# Patient Record
Sex: FEMALE | Race: WHITE | NOT HISPANIC OR LATINO | Employment: OTHER | URBAN - METROPOLITAN AREA
[De-identification: names, ages, dates, MRNs, and addresses within clinical notes are randomized per-mention and may not be internally consistent; named-entity substitution may affect disease eponyms.]

---

## 2017-01-17 ENCOUNTER — HOSPITAL ENCOUNTER (OUTPATIENT)
Facility: MEDICAL CENTER | Age: 82
End: 2017-01-17
Attending: FAMILY MEDICINE
Payer: MEDICARE

## 2017-01-17 LAB
APPEARANCE UR: ABNORMAL
BACTERIA #/AREA URNS HPF: ABNORMAL /HPF
BILIRUB UR QL STRIP.AUTO: NEGATIVE
COLOR UR AUTO: ABNORMAL
CULTURE IF INDICATED INDCX: YES UA CULTURE
EPITHELIAL CELLS 1715: ABNORMAL /HPF
GLUCOSE UR STRIP.AUTO-MCNC: NEGATIVE MG/DL
KETONES UR STRIP.AUTO-MCNC: NEGATIVE MG/DL
LEUKOCYTE ESTERASE UR QL STRIP.AUTO: ABNORMAL
MICRO URNS: ABNORMAL
MUCOUS THREADS URNS QL MICRO: ABNORMAL /HPF
NITRITE UR QL STRIP.AUTO: POSITIVE
PH UR: 6.5 [PH]
PROT UR QL STRIP: NEGATIVE MG/DL
RBC #/AREA URNS HPF: ABNORMAL /HPF
RBC UR QL AUTO: ABNORMAL
SP GR UR STRIP.AUTO: 1.01
WBC #/AREA URNS HPF: ABNORMAL /HPF

## 2017-01-17 PROCEDURE — 81001 URINALYSIS AUTO W/SCOPE: CPT

## 2017-01-17 PROCEDURE — 87186 SC STD MICRODIL/AGAR DIL: CPT

## 2017-01-17 PROCEDURE — 87086 URINE CULTURE/COLONY COUNT: CPT

## 2017-01-17 PROCEDURE — 87077 CULTURE AEROBIC IDENTIFY: CPT

## 2017-01-19 LAB
BACTERIA UR CULT: ABNORMAL
SIGNIFICANT IND 70042: ABNORMAL
SOURCE SOURCE: ABNORMAL

## 2017-03-10 ENCOUNTER — OFFICE VISIT (OUTPATIENT)
Dept: CARDIOLOGY | Facility: MEDICAL CENTER | Age: 82
End: 2017-03-10
Payer: MEDICARE

## 2017-03-10 VITALS
BODY MASS INDEX: 22.71 KG/M2 | SYSTOLIC BLOOD PRESSURE: 105 MMHG | DIASTOLIC BLOOD PRESSURE: 70 MMHG | HEIGHT: 64 IN | WEIGHT: 133 LBS | HEART RATE: 83 BPM | OXYGEN SATURATION: 94 %

## 2017-03-10 DIAGNOSIS — I10 ESSENTIAL HYPERTENSION: ICD-10-CM

## 2017-03-10 DIAGNOSIS — D64.89 ANEMIA DUE TO OTHER CAUSE: ICD-10-CM

## 2017-03-10 DIAGNOSIS — I35.0 AORTIC STENOSIS, SEVERE: ICD-10-CM

## 2017-03-10 PROCEDURE — G8484 FLU IMMUNIZE NO ADMIN: HCPCS | Performed by: INTERNAL MEDICINE

## 2017-03-10 PROCEDURE — 4040F PNEUMOC VAC/ADMIN/RCVD: CPT | Mod: 8P | Performed by: INTERNAL MEDICINE

## 2017-03-10 PROCEDURE — 1036F TOBACCO NON-USER: CPT | Performed by: INTERNAL MEDICINE

## 2017-03-10 PROCEDURE — 99497 ADVNCD CARE PLAN 30 MIN: CPT | Performed by: INTERNAL MEDICINE

## 2017-03-10 PROCEDURE — 1101F PT FALLS ASSESS-DOCD LE1/YR: CPT | Mod: 8P | Performed by: INTERNAL MEDICINE

## 2017-03-10 PROCEDURE — G8419 CALC BMI OUT NRM PARAM NOF/U: HCPCS | Performed by: INTERNAL MEDICINE

## 2017-03-10 PROCEDURE — G8432 DEP SCR NOT DOC, RNG: HCPCS | Performed by: INTERNAL MEDICINE

## 2017-03-10 PROCEDURE — G8598 ASA/ANTIPLAT THER USED: HCPCS | Performed by: INTERNAL MEDICINE

## 2017-03-10 PROCEDURE — 99214 OFFICE O/P EST MOD 30 MIN: CPT | Performed by: INTERNAL MEDICINE

## 2017-03-10 ASSESSMENT — ENCOUNTER SYMPTOMS
BRUISES/BLEEDS EASILY: 0
SPEECH CHANGE: 0
DIZZINESS: 0
VOMITING: 0
NAUSEA: 0
CHILLS: 0
CLAUDICATION: 0
BLOOD IN STOOL: 0
ABDOMINAL PAIN: 0
HALLUCINATIONS: 0
FALLS: 0
PND: 0
WEIGHT LOSS: 0
EYE DISCHARGE: 0
MYALGIAS: 0
FEVER: 0
EYE PAIN: 0
SHORTNESS OF BREATH: 0
HEADACHES: 0
BLURRED VISION: 0
ORTHOPNEA: 0
DEPRESSION: 0
LOSS OF CONSCIOUSNESS: 0
PALPITATIONS: 0
DOUBLE VISION: 0
COUGH: 0
SENSORY CHANGE: 0

## 2017-03-10 NOTE — Clinical Note
[facility]   [address], [facility] [address]  [place] [address]  Phone: [phone]  Fax: [phone]              Noemi Gutierrez  3/6/1933    Encounter Date: 3/10/2017    Marcia Cabrera M.D.          PROGRESS NOTE:  Subjective:   Noemi Gutierrez is a 84 y.o. female who presents today for cardiac evaluation of severe AS (mean gradient of 42 mm Hg on TTE). She is living in [facility] now. Patient does report of having lightheadedness and dyspnea on exertion. But the most bothersome symptoms are her LE weakness from back problems. No angina. She is in wheel chair most of the time. Some feet swelling. In the past, several times we brought up the idea of TAVR but patient did not want to pursue that option. She would like to die a natural death. Patient has not been moving around much however. She is quite deconditioned.    No clinical change since last visit.    Past Medical History   Diagnosis Date   • Arthritis    • Hypertension    • Indigestion    • Backpain    • CATARACT    • Unspecified urinary incontinence    • Breast cancer (CMS-[facility]) 11/3/2010   • HTN (hypertension) 11/3/2010   • Dyslipidemia 11/3/2010   • Osteopenia 11/3/2010   • Eczema 12/1/2010   • Ulcer (CMS-HCC) 2001     bleeding; requiring blood transfusion   • Stroke (CMS-HCC) 11/2010     mild right sided weakness   • Hiatus hernia syndrome    • Cancer (CMS-HCC) 2009     right breast, radiation   • Dental disorder      from radiation tx   • ASTHMA      sinus problems   • Heart murmur 11/10/2015     Past Surgical History   Procedure Laterality Date   • Other orthopedic surgery     • Laminotomy  2004   • Appendectomy child     • Tonsillectomy  as child   • Inguinal hernia repair  2006     right   • Lumpectomy  10/2009     right   • Colonoscopy flex w/endo us  1/18/2011     Performed by ELVIN LUCIO at [facility]   • Lumbar fusion posterior  6/13/2013     Performed by Gilbert Lee M.D.  at SURGERY [facility]   • Lumbar decompression  6/13/2013     Performed by Gilbert Lee M.D. at SURGERY [facility]     Family History   Problem Relation Age of Onset   • Heart Disease     • Hypertension     • Stroke     • Cancer       History   Smoking status   • Never Smoker    Smokeless tobacco   • Never Used     Allergies   Allergen Reactions   • Codeine Itching   • Lisinopril Cough   • Sulfa Drugs Itching     Outpatient Encounter Prescriptions as of 3/10/2017   Medication Sig Dispense Refill   • lisinopril (PRINIVIL) 5 MG Tab Take 5 mg by mouth every day.     • senna-docusate (PERICOLACE OR SENOKOT S) 8.6-50 MG Tab Take 1 Tab by mouth every day.     • duloxetine (CYMBALTA) 30 MG Cap DR Particles Take 30 mg by mouth every day.     • Ferrous Fumarate 325 (106 FE) MG Tab Take  by mouth.     • gabapentin (NEURONTIN) 100 MG Cap Take 100 mg by mouth every day.     • latanoprost (XALATAN) 0.005 % Solution Place 1 Drop in both eyes every evening.     • omeprazole (PRILOSEC) 20 MG delayed-release capsule Take 20 mg by mouth every day.     • acetaminophen (TYLENOL) 500 MG Tab Take 500-1,000 mg by mouth every 6 hours as needed.     • Cyanocobalamin (VITAMIN B-12) 1000 MCG Tab Take 1,000 mcg by mouth every day.     • Cholecalciferol (VITAMIN D3) 5000 UNITS Tab Take  by mouth.     • aspirin EC (ECOTRIN) 81 MG Tablet Delayed Response Take 81 mg by mouth every day.     • cefTRIAXone (ROCEPHIN) 1 GM Recon Soln 1 g by Intramuscular route every day.     • ciprofloxacin (CIPRO) 500 MG Tab Take 500 mg by mouth 2 times a day.     • oxycodone-acetaminophen (PERCOCET) 5-325 MG Tab Take 1-2 Tabs by mouth every four hours as needed.     • melatonin 3 MG Tab Take 3 mg by mouth every bedtime.     • furosemide (LASIX) 20 MG Tab Take 1 Tab by mouth as needed. 30 Tab 0   • nitrofurantoin monohydr macro (MACROBID) 100 MG Cap Take 100 mg by mouth every day.     • alprazolam (XANAX) 0.5 MG Tab Take 0.5 mg by mouth at bedtime as  "needed for Sleep.     • simvastatin (ZOCOR) 20 MG TABS Take 1 Tab by mouth every evening. 30 Tab      No facility-administered encounter medications on file as of 3/10/2017.     Review of Systems   Constitutional: Negative for fever, chills, weight loss and malaise/fatigue.   HENT: Negative for ear discharge, ear pain, hearing loss and nosebleeds.    Eyes: Negative for blurred vision, double vision, pain and discharge.   Respiratory: Negative for cough and shortness of breath.    Cardiovascular: Negative for chest pain, palpitations, orthopnea, claudication, leg swelling and PND.   Gastrointestinal: Negative for nausea, vomiting, abdominal pain, blood in stool and melena.   Genitourinary: Negative for dysuria and hematuria.   Musculoskeletal: Negative for myalgias, joint pain and falls.   Skin: Negative for itching and rash.   Neurological: Negative for dizziness, sensory change, speech change, loss of consciousness and headaches.   Endo/Heme/Allergies: Negative for environmental allergies. Does not bruise/bleed easily.   Psychiatric/Behavioral: Negative for depression, suicidal ideas and hallucinations.        Objective:   /70 mmHg  Pulse 83  Ht 1.626 m (5' 4\")  Wt 60.328 kg (133 lb)  BMI 22.82 kg/m2  SpO2 94%    Physical Exam   Constitutional: She is oriented to person, place, and time. No distress.   HENT:   Head: Normocephalic and atraumatic.   Eyes: EOM are normal.   Neck: No JVD present.   Cardiovascular: Normal rate, regular rhythm and intact distal pulses.  Exam reveals no gallop and no friction rub.    Murmur heard.  there is 4/6 systolic murmur heard best at the right border of the aortic valve area. The murmur does not radiate to the carotids.     Pulmonary/Chest: No respiratory distress. She has no wheezes. She has no rales. She exhibits no tenderness.   Abdominal: She exhibits no distension. There is no tenderness. There is no rebound and no guarding.   Musculoskeletal: She exhibits no edema " or tenderness.   Lymphadenopathy:     She has no cervical adenopathy.   Neurological: She is alert and oriented to person, place, and time.   Skin: Skin is dry.   Psychiatric: She has a normal mood and affect.   Nursing note and vitals reviewed.      Assessment:     1. Essential hypertension     2. Aortic stenosis, severe     3. Anemia due to other cause         Medical Decision Making:  Today's Assessment / Status / Plan:     This is a deconditioned 84 yo patient with symptomatic severe AS.  I again discussed different options with patient in regards to open heart surgery vs. TAVR. Patient for sure does not want to proceed with any open heart surgery at this time or TAVR.     I spent half an hours with her face-to-face in discussing with her and her daughter about end-of-life issues. At this time, we will fill out POLST. Patient would like to be DNR.    Cont current medications at current dose.     Lasix prn for feet swelling.    Encourage more ambulation and raise feet up.    I will see patient back in clinic with lab tests and studies results in 9 months.    I thank you Dr. Chacon for referring patient to our Cardiology Clinic today.      Rafael Medley M.D.  [address]  [address]  [place] [id number]  VIA Mail

## 2017-03-10 NOTE — MR AVS SNAPSHOT
"        Noemi Gutierrez   3/10/2017 1:00 PM   Office Visit   MRN: [id number]    Department:  [facility]   Dept Phone:  [phone]    Description:  Female : 3/6/1933   Provider:  Marcia Cabrera M.D.           Reason for Visit     Follow-Up           Allergies as of 3/10/2017     Allergen Noted Reactions    Codeine 2010   Itching    Lisinopril 2016   Cough    Sulfa Drugs 2010   Itching      You were diagnosed with     Essential hypertension   [8359882]       Aortic stenosis, severe   [302276]       Anemia due to other cause   [8900802]         Vital Signs     Blood Pressure Pulse Height Weight Body Mass Index Oxygen Saturation    105/70 mmHg 83 1.626 m (5' 4\") 60.328 kg (133 lb) 22.82 kg/m2 94%    Smoking Status                   Never Smoker            Basic Information     Date Of Birth Sex Race Ethnicity Preferred Language    3/6/1933 Female White Non- English      Problem List              ICD-10-CM Priority Class Noted - Resolved    Breast cancer (CMS-HCC) C50.919   11/3/2010 - Present    HTN (hypertension) I10   11/3/2010 - Present    Dyslipidemia E78.5   11/3/2010 - Present    Cerebral infarction (CMS-HCC) I63.9   11/3/2010 - Present    Osteopenia M85.80   11/3/2010 - Present    Eczema L30.9   2010 - Present    Spinal stenosis, lumbar region, without neurogenic claudication M48.06   2013 - Present    Lower urinary tract infectious disease N39.0 Medium  2015 - Present    Encephalopathy acute G93.40 High  11/10/2015 - Present    Heart murmur R01.1   11/10/2015 - Present      Health Maintenance        Date Due Completion Dates    IMM DTaP/Tdap/Td Vaccine (1 - Tdap) 3/6/1952 ---    PAP SMEAR 3/6/1954 ---    IMM ZOSTER VACCINE 3/6/1993 ---    BONE DENSITY 3/6/1998 ---    IMM PNEUMOCOCCAL 65+ (ADULT) LOW/MEDIUM RISK SERIES (1 of 2 - PCV13) 3/6/1998 ---    MAMMOGRAM 2016, 2015, 11/10/2008, 11/10/2008, 2007, 2007, 11/3/2006, 2005, " 10/28/2004    IMM INFLUENZA (1) 9/1/2016 11/12/2015, 10/1/2012    COLONOSCOPY 1/18/2021 1/18/2011            Current Immunizations     Influenza TIV (IM) 10/1/2012    Influenza Vaccine Adult HD 11/12/2015  2:17 PM    Pneumococcal Vaccine (UF)Historical Data 1/1/2012      Below and/or attached are the medications your provider expects you to take. Review all of your home medications and newly ordered medications with your provider and/or pharmacist. Follow medication instructions as directed by your provider and/or pharmacist. Please keep your medication list with you and share with your provider. Update the information when medications are discontinued, doses are changed, or new medications (including over-the-counter products) are added; and carry medication information at all times in the event of emergency situations     Allergies:  CODEINE - Itching     LISINOPRIL - Cough     SULFA DRUGS - Itching               Medications  Valid as of: March 10, 2017 -  1:33 PM    Generic Name Brand Name Tablet Size Instructions for use    Acetaminophen (Tab) TYLENOL 500 MG Take 500-1,000 mg by mouth every 6 hours as needed.        ALPRAZolam (Tab) XANAX 0.5 MG Take 0.5 mg by mouth at bedtime as needed for Sleep.        Aspirin (Tablet Delayed Response) ECOTRIN 81 MG Take 81 mg by mouth every day.        CefTRIAXone Sodium (Recon Soln) ROCEPHIN 1 GM 1 g by Intramuscular route every day.        Cholecalciferol (Tab) Vitamin D3 5000 UNITS Take  by mouth.        Ciprofloxacin HCl (Tab) CIPRO 500 MG Take 500 mg by mouth 2 times a day.        Cyanocobalamin (Tab) vitamin B-12 1000 MCG Take 1,000 mcg by mouth every day.        DULoxetine HCl (Cap DR Particles) CYMBALTA 30 MG Take 30 mg by mouth every day.        Ferrous Fumarate (Tab) Ferrous Fumarate 325 (106 FE) MG Take  by mouth.        Furosemide (Tab) LASIX 20 MG Take 1 Tab by mouth as needed.        Gabapentin (Cap) NEURONTIN 100 MG Take 100 mg by mouth every day.         Latanoprost (Solution) XALATAN 0.005 % Place 1 Drop in both eyes every evening.        Lisinopril (Tab) PRINIVIL 5 MG Take 5 mg by mouth every day.        Melatonin (Tab) melatonin 3 MG Take 3 mg by mouth every bedtime.        Nitrofurantoin Monohyd Macro (Cap) MACROBID 100 MG Take 100 mg by mouth every day.        Omeprazole (CAPSULE DELAYED RELEASE) PRILOSEC 20 MG Take 20 mg by mouth every day.        Oxycodone-Acetaminophen (Tab) PERCOCET 5-325 MG Take 1-2 Tabs by mouth every four hours as needed.        Sennosides-Docusate Sodium (Tab) PERICOLACE or SENOKOT S 8.6-50 MG Take 1 Tab by mouth every day.        Simvastatin (Tab) ZOCOR 20 MG Take 1 Tab by mouth every evening.        .                 Medicines prescribed today were sent to:     [facility] [address] [place] [address] (), [place] - [address]    [address]) [place] [address]    Phone: [phone] Fax: [phone]    Open 24 Hours?: No    [facility] [address] [place] [address], [place] - [address] [facility]    [address] [place] [address] [place] [address]    Phone: [phone] Fax: [phone]    Open 24 Hours?: No      Medication refill instructions:       If your prescription bottle indicates you have medication refills left, it is not necessary to call your provider’s office. Please contact your pharmacy and they will refill your medication.    If your prescription bottle indicates you do not have any refills left, you may request refills at any time through one of the following ways: The online [employer] system (except Urgent Care), by calling your provider’s office, or by asking your pharmacy to contact your provider’s office with a refill request. Medication refills are processed only during regular business hours and may not be available until the next business day. Your provider may request additional information or to have a follow-up visit with you prior to refilling your medication.   *Please Note: Medication refills are assigned a new Rx number when refilled  electronically. Your pharmacy may indicate that no refills were authorized even though a new prescription for the same medication is available at the pharmacy. Please request the medicine by name with the pharmacy before contacting your provider for a refill.           [employer] Access Code: [email or web address]  Expires: 4/9/2017  1:33 PM    [employer]  A secure, online tool to manage your health information     [employer]’s [employer]® is a secure, online tool that connects you to your personalized health information from the privacy of your home -- day or night - making it very easy for you to manage your healthcare. Once the activation process is completed, you can even access your medical information using the [employer] carlos a, which is available for free in the Apple Carlos A store or Google Play store.     [employer] provides the following levels of access (as shown below):   My Chart Features   [facility] Primary Care Doctor [facility]  Specialists [facility]  Urgent  Care Non-[facility]  Primary Care  Doctor   Email your healthcare team securely and privately 24/7 X X X    Manage appointments: schedule your next appointment; view details of past/upcoming appointments X      Request prescription refills. X      View recent personal medical records, including lab and immunizations X X X X   View health record, including health history, allergies, medications X X X X   Read reports about your outpatient visits, procedures, consult and ER notes X X X X   See your discharge summary, which is a recap of your hospital and/or ER visit that includes your diagnosis, lab results, and care plan. X X       How to register for [employer]:  1. Go to  [email or web address].  2. Click on the Sign Up Now box, which takes you to the New Member Sign Up page. You will need to provide the following information:  a. Enter your [employer] Access Code exactly as it appears at the top of this page. (You will not need to use this code after you’ve completed the sign-up  process. If you do not sign up before the expiration date, you must request a new code.)   b. Enter your date of birth.   c. Enter your home email address.   d. Click Submit, and follow the next screen’s instructions.  3. Create a Indeedt ID. This will be your Indeedt login ID and cannot be changed, so think of one that is secure and easy to remember.  4. Create a Indeed[employer] password. You can change your password at any time.  5. Enter your Password Reset Question and Answer. This can be used at a later time if you forget your password.   6. Enter your e-mail address. This allows you to receive e-mail notifications when new information is available in [employer].  7. Click Sign Up. You can now view your health information.    For assistance activating your [employer] account, call [phone]

## 2017-03-10 NOTE — PROGRESS NOTES
Subjective:   Noemi Gutierrez is a 84 y.o. female who presents today for cardiac evaluation of severe AS (mean gradient of 42 mm Hg on TTE). She is living in [facility] now. Patient does report of having lightheadedness and dyspnea on exertion. But the most bothersome symptoms are her LE weakness from back problems. No angina. She is in wheel chair most of the time. Some feet swelling. In the past, several times we brought up the idea of TAVR but patient did not want to pursue that option. She would like to die a natural death. Patient has not been moving around much however. She is quite deconditioned.    No clinical change since last visit.    Past Medical History   Diagnosis Date   • Arthritis    • Hypertension    • Indigestion    • Backpain    • CATARACT    • Unspecified urinary incontinence    • Breast cancer (CMS-HCC) 11/3/2010   • HTN (hypertension) 11/3/2010   • Dyslipidemia 11/3/2010   • Osteopenia 11/3/2010   • Eczema 12/1/2010   • Ulcer (CMS-HCC) 2001     bleeding; requiring blood transfusion   • Stroke (CMS-HCC) 11/2010     mild right sided weakness   • Hiatus hernia syndrome    • Cancer (CMS-HCC) 2009     right breast, radiation   • Dental disorder      from radiation tx   • ASTHMA      sinus problems   • Heart murmur 11/10/2015     Past Surgical History   Procedure Laterality Date   • Other orthopedic surgery     • Laminotomy  2004   • Appendectomy child     • Tonsillectomy  as child   • Inguinal hernia repair  2006     right   • Lumpectomy  10/2009     right   • Colonoscopy flex w/endo us  1/18/2011     Performed by ELVIN LUCIO at [facility]ERY [facility]   • Lumbar fusion posterior  6/13/2013     Performed by Gilbert Lee M.D. at [facility]   • Lumbar decompression  6/13/2013     Performed by Gilbert Lee M.D. at SUR[facility]ERY [facility]     Family History   Problem Relation Age of Onset   • Heart Disease     • Hypertension     • Stroke     • Cancer       History   Smoking  status   • Never Smoker    Smokeless tobacco   • Never Used     Allergies   Allergen Reactions   • Codeine Itching   • Lisinopril Cough   • Sulfa Drugs Itching     Outpatient Encounter Prescriptions as of 3/10/2017   Medication Sig Dispense Refill   • lisinopril (PRINIVIL) 5 MG Tab Take 5 mg by mouth every day.     • senna-docusate (PERICOLACE OR SENOKOT S) 8.6-50 MG Tab Take 1 Tab by mouth every day.     • duloxetine (CYMBALTA) 30 MG Cap DR Particles Take 30 mg by mouth every day.     • Ferrous Fumarate 325 (106 FE) MG Tab Take  by mouth.     • gabapentin (NEURONTIN) 100 MG Cap Take 100 mg by mouth every day.     • latanoprost (XALATAN) 0.005 % Solution Place 1 Drop in both eyes every evening.     • omeprazole (PRILOSEC) 20 MG delayed-release capsule Take 20 mg by mouth every day.     • acetaminophen (TYLENOL) 500 MG Tab Take 500-1,000 mg by mouth every 6 hours as needed.     • Cyanocobalamin (VITAMIN B-12) 1000 MCG Tab Take 1,000 mcg by mouth every day.     • Cholecalciferol (VITAMIN D3) 5000 UNITS Tab Take  by mouth.     • aspirin EC (ECOTRIN) 81 MG Tablet Delayed Response Take 81 mg by mouth every day.     • cefTRIAXone (ROCEPHIN) 1 GM Recon Soln 1 g by Intramuscular route every day.     • ciprofloxacin (CIPRO) 500 MG Tab Take 500 mg by mouth 2 times a day.     • oxycodone-acetaminophen (PERCOCET) 5-325 MG Tab Take 1-2 Tabs by mouth every four hours as needed.     • melatonin 3 MG Tab Take 3 mg by mouth every bedtime.     • furosemide (LASIX) 20 MG Tab Take 1 Tab by mouth as needed. 30 Tab 0   • nitrofurantoin monohydr macro (MACROBID) 100 MG Cap Take 100 mg by mouth every day.     • alprazolam (XANAX) 0.5 MG Tab Take 0.5 mg by mouth at bedtime as needed for Sleep.     • simvastatin (ZOCOR) 20 MG TABS Take 1 Tab by mouth every evening. 30 Tab      No facility-administered encounter medications on file as of 3/10/2017.     Review of Systems   Constitutional: Negative for fever, chills, weight loss and  "malaise/fatigue.   HENT: Negative for ear discharge, ear pain, hearing loss and nosebleeds.    Eyes: Negative for blurred vision, double vision, pain and discharge.   Respiratory: Negative for cough and shortness of breath.    Cardiovascular: Negative for chest pain, palpitations, orthopnea, claudication, leg swelling and PND.   Gastrointestinal: Negative for nausea, vomiting, abdominal pain, blood in stool and melena.   Genitourinary: Negative for dysuria and hematuria.   Musculoskeletal: Negative for myalgias, joint pain and falls.   Skin: Negative for itching and rash.   Neurological: Negative for dizziness, sensory change, speech change, loss of consciousness and headaches.   Endo/Heme/Allergies: Negative for environmental allergies. Does not bruise/bleed easily.   Psychiatric/Behavioral: Negative for depression, suicidal ideas and hallucinations.        Objective:   /70 mmHg  Pulse 83  Ht 1.626 m (5' 4\")  Wt 60.328 kg (133 lb)  BMI 22.82 kg/m2  SpO2 94%    Physical Exam   Constitutional: She is oriented to person, place, and time. No distress.   HENT:   Head: Normocephalic and atraumatic.   Eyes: EOM are normal.   Neck: No JVD present.   Cardiovascular: Normal rate, regular rhythm and intact distal pulses.  Exam reveals no gallop and no friction rub.    Murmur heard.  there is 4/6 systolic murmur heard best at the right border of the aortic valve area. The murmur does not radiate to the carotids.     Pulmonary/Chest: No respiratory distress. She has no wheezes. She has no rales. She exhibits no tenderness.   Abdominal: She exhibits no distension. There is no tenderness. There is no rebound and no guarding.   Musculoskeletal: She exhibits no edema or tenderness.   Lymphadenopathy:     She has no cervical adenopathy.   Neurological: She is alert and oriented to person, place, and time.   Skin: Skin is dry.   Psychiatric: She has a normal mood and affect.   Nursing note and vitals " reviewed.      Assessment:     1. Essential hypertension     2. Aortic stenosis, severe     3. Anemia due to other cause         Medical Decision Making:  Today's Assessment / Status / Plan:     This is a deconditioned 82 yo patient with symptomatic severe AS.  I again discussed different options with patient in regards to open heart surgery vs. TAVR. Patient for sure does not want to proceed with any open heart surgery at this time or TAVR.     I spent half an hours with her face-to-face in discussing with her and her daughter about end-of-life issues. At this time, we will fill out POLST. Patient would like to be DNR.    Cont current medications at current dose.     Lasix prn for feet swelling.    Encourage more ambulation and raise feet up.    I will see patient back in clinic with lab tests and studies results in 9 months.    I thank you Dr. Chacon for referring patient to our Cardiology Clinic today.

## 2017-08-25 ENCOUNTER — OFFICE VISIT (OUTPATIENT)
Dept: NEPHROLOGY | Facility: MEDICAL CENTER | Age: 82
End: 2017-08-25
Payer: MEDICARE

## 2017-08-25 VITALS
SYSTOLIC BLOOD PRESSURE: 104 MMHG | OXYGEN SATURATION: 94 % | HEART RATE: 96 BPM | DIASTOLIC BLOOD PRESSURE: 66 MMHG | TEMPERATURE: 98.4 F | BODY MASS INDEX: 23.05 KG/M2 | HEIGHT: 64 IN | WEIGHT: 135 LBS

## 2017-08-25 DIAGNOSIS — N39.0 RECURRENT UTI: ICD-10-CM

## 2017-08-25 PROCEDURE — 99203 OFFICE O/P NEW LOW 30 MIN: CPT | Performed by: INTERNAL MEDICINE

## 2017-08-25 ASSESSMENT — ENCOUNTER SYMPTOMS
FEVER: 0
CHILLS: 0
PALPITATIONS: 0

## 2017-08-25 NOTE — PROGRESS NOTES
"Subjective:      Noemi Gutierrez is a 84 y.o. female who presents with Recurrent UTI New Patient            HPI  84 year old who comes to the clinic noting recurrent UTIs. She is at [facility], and her daughter states that she has had UTIs about monthly for the last year. She is currently on cipro 500mg BID and has been on this for the whole month. She has also been on macrobid as prophylaxis but was discontinued with the UTI.    The patient has had a renal US showing a right kidney of 7.5 cm and and no stones, and left kidney with 8.4 cm with a 7.2cm renal cyst.    She has functional incontinence and she is unsure how often these are changed. She has just seen urology and their recommendations are noted.    UTIs noted are ecoli in June and July and Klebsiella in August.  Review of Systems   Constitutional: Negative for fever and chills.   Cardiovascular: Negative for chest pain and palpitations.   Genitourinary: Positive for dysuria.          Objective:     /66 mmHg  Pulse 96  Temp(Src) 36.9 °C (98.4 °F)  Ht 1.626 m (5' 4.02\")  Wt 61.236 kg (135 lb)  BMI 23.16 kg/m2  SpO2 94%     Physical Exam   Constitutional: She is oriented to person, place, and time. She appears well-developed and well-nourished.   Cardiovascular: Normal rate and regular rhythm.    Pulmonary/Chest: Effort normal and breath sounds normal.   Neurological: She is alert and oriented to person, place, and time.   Skin: Skin is warm and dry.   Psychiatric: She has a normal mood and affect. Her behavior is normal.               Assessment/Plan:     1. Recurrent UTI    -From a renal standpoint, there appears to be no nidus of infection that is causing recurrent UTIs. Is suspect this is due to her reliance on her adult undergarment in voiding, and not using a commode.   -Given sensitivities, the current infection should be treated rapidly, I agree with changing the prophylaxis abx.  Urology has recommended metenamine, if this does " not work for her, other options would include bactrim  -At this point, given the above, would not suggest further imaging at this time.  -The Cr is elevated, however, has some hemodynamic variability, would simply monitor.      2. CKD stage III  Cr indicates CKD stage III. However, if continues to climb, will need to adjust medication for GFR. Discussed with patient and daughter.    -At this point, will see PRN, if Cr increases  -urology is currently managing the recurrent UTIs

## 2017-08-25 NOTE — MR AVS SNAPSHOT
"        Noemi Gutierrez   2017 3:00 PM   Office Visit   MRN: [id number]    Department:  [facility]   Dept Phone:  [phone]    Description:  Female : 3/6/1933   Provider:  Davy Patrick M.D.           Reason for Visit     New Patient           Allergies as of 2017     Allergen Noted Reactions    Codeine 2010   Itching    Lisinopril 2016   Cough    Sulfa Drugs 2010   Itching      You were diagnosed with     Recurrent UTI   [[id number]]         Vital Signs     Blood Pressure Pulse Temperature Height Weight Body Mass Index    104/66 mmHg 96 36.9 °C (98.4 °F) 1.626 m (5' 4.02\") 61.236 kg (135 lb) 23.16 kg/m2    Oxygen Saturation Smoking Status                94% Never Smoker           Basic Information     Date Of Birth Sex Race Ethnicity Preferred Language    3/6/1933 Female White Non- English      Your appointments     Oct 13, 2017  1:45 PM   FOLLOW UP with Marcia Cabrera M.D.   [facility] (--)    [address], [address]  [place] [address]   [phone]              Problem List              ICD-10-CM Priority Class Noted - Resolved    Breast cancer (CMS-HCC) C50.919   11/3/2010 - Present    HTN (hypertension) I10   11/3/2010 - Present    Dyslipidemia E78.5   11/3/2010 - Present    Cerebral infarction (CMS-HCC) I63.9   11/3/2010 - Present    Osteopenia M85.80   11/3/2010 - Present    Eczema L30.9   2010 - Present    Spinal stenosis, lumbar region, without neurogenic claudication M48.06   2013 - Present    Heart murmur R01.1   11/10/2015 - Present    Recurrent UTI N39.0   2017 - Present      Health Maintenance        Date Due Completion Dates    IMM DTaP/Tdap/Td Vaccine (1 - Tdap) 3/6/1952 ---    PAP SMEAR 3/6/1954 ---    IMM ZOSTER VACCINE 3/6/1993 ---    BONE DENSITY 3/6/1998 ---    IMM PNEUMOCOCCAL 65+ (ADULT) LOW/MEDIUM RISK SERIES (1 of 2 - PCV13) 3/6/1998 ---    MAMMOGRAM 2016, " 11/10/2008, 11/10/2008, 11/6/2007, 11/6/2007, 11/3/2006, 11/1/2005, 10/28/2004    IMM INFLUENZA (1) 9/1/2017 11/12/2015, 10/1/2012    COLONOSCOPY 1/18/2021 1/18/2011            Current Immunizations     Influenza TIV (IM) 10/1/2012    Influenza Vaccine Adult HD 11/12/2015  2:17 PM    Pneumococcal Vaccine (UF)Historical Data 1/1/2012      Below and/or attached are the medications your provider expects you to take. Review all of your home medications and newly ordered medications with your provider and/or pharmacist. Follow medication instructions as directed by your provider and/or pharmacist. Please keep your medication list with you and share with your provider. Update the information when medications are discontinued, doses are changed, or new medications (including over-the-counter products) are added; and carry medication information at all times in the event of emergency situations     Allergies:  CODEINE - Itching     LISINOPRIL - Cough     SULFA DRUGS - Itching               Medications  Valid as of: August 25, 2017 -  3:46 PM    Generic Name Brand Name Tablet Size Instructions for use    Acetaminophen (Tab) TYLENOL 500 MG Take 500-1,000 mg by mouth every 6 hours as needed.        ALPRAZolam (Tab) XANAX 0.5 MG Take 0.5 mg by mouth at bedtime as needed for Sleep.        Aspirin (Tablet Delayed Response) ECOTRIN 81 MG Take 81 mg by mouth every day.        CefTRIAXone Sodium (Recon Soln) ROCEPHIN 1 GM 1 g by Intramuscular route every day.        Cholecalciferol (Tab) Vitamin D3 5000 units Take  by mouth.        Ciprofloxacin HCl (Tab) CIPRO 500 MG Take 500 mg by mouth 2 times a day.        Cyanocobalamin (Tab) vitamin B-12 1000 MCG Take 1,000 mcg by mouth every day.        DULoxetine HCl (Cap DR Particles) CYMBALTA 30 MG Take 30 mg by mouth every day.        Ferrous Fumarate (Tab) Ferrous Fumarate 325 (106 Fe) MG Take  by mouth.        Furosemide (Tab) LASIX 20 MG Take 1 Tab by mouth as needed.        Gabapentin  (Cap) NEURONTIN 100 MG Take 100 mg by mouth every day.        Latanoprost (Solution) XALATAN 0.005 % Place 1 Drop in both eyes every evening.        Lisinopril (Tab) PRINIVIL 5 MG Take 5 mg by mouth every day.        Melatonin (Tab) melatonin 3 MG Take 3 mg by mouth every bedtime.        Nitrofurantoin Monohyd Macro (Cap) MACROBID 100 MG Take 100 mg by mouth every day.        Omeprazole (CAPSULE DELAYED RELEASE) PRILOSEC 20 MG Take 20 mg by mouth every day.        Oxycodone-Acetaminophen (Tab) PERCOCET 5-325 MG Take 1-2 Tabs by mouth every four hours as needed.        Sennosides-Docusate Sodium (Tab) PERICOLACE or SENOKOT S 8.6-50 MG Take 1 Tab by mouth every day.        Simvastatin (Tab) ZOCOR 20 MG Take 1 Tab by mouth every evening.        .                 Medicines prescribed today were sent to:     [facility] [address] [place] [address], [place] [address]) [place] [address]    Phone: [phone] Fax: [phone]    Open 24 Hours?: No    [facility] [address] [place] [address], [place] - [address] [facility]    [address] [place] [address]    Phone: [phone] Fax: [phone]    Open 24 Hours?: No      Medication refill instructions:       If your prescription bottle indicates you have medication refills left, it is not necessary to call your provider’s office. Please contact your pharmacy and they will refill your medication.    If your prescription bottle indicates you do not have any refills left, you may request refills at any time through one of the following ways: The online [email or web address] system (except Urgent Care), by calling your provider’s office, or by asking your pharmacy to contact your provider’s office with a refill request. Medication refills are processed only during regular business hours and may not be available until the next business day. Your provider may request additional information or to have a follow-up visit with you prior to refilling your medication.   *Please Note: Medication  refills are assigned a new Rx number when refilled electronically. Your pharmacy may indicate that no refills were authorized even though a new prescription for the same medication is available at the pharmacy. Please request the medicine by name with the pharmacy before contacting your provider for a refill.           MyChart Status: Patient Declined

## 2017-12-19 ENCOUNTER — OFFICE VISIT (OUTPATIENT)
Dept: CARDIOLOGY | Facility: MEDICAL CENTER | Age: 82
End: 2017-12-19
Payer: MEDICARE

## 2017-12-19 VITALS
HEIGHT: 64 IN | HEART RATE: 88 BPM | SYSTOLIC BLOOD PRESSURE: 120 MMHG | WEIGHT: 139 LBS | BODY MASS INDEX: 23.73 KG/M2 | DIASTOLIC BLOOD PRESSURE: 68 MMHG | OXYGEN SATURATION: 95 %

## 2017-12-19 DIAGNOSIS — E78.5 DYSLIPIDEMIA: ICD-10-CM

## 2017-12-19 DIAGNOSIS — I51.89 LEFT VENTRICULAR DIASTOLIC DYSFUNCTION, NYHA CLASS 3: ICD-10-CM

## 2017-12-19 DIAGNOSIS — I35.0 AORTIC STENOSIS, SEVERE: ICD-10-CM

## 2017-12-19 DIAGNOSIS — M48.061 SPINAL STENOSIS, LUMBAR REGION, WITHOUT NEUROGENIC CLAUDICATION: ICD-10-CM

## 2017-12-19 DIAGNOSIS — I10 ESSENTIAL HYPERTENSION: ICD-10-CM

## 2017-12-19 PROCEDURE — 99215 OFFICE O/P EST HI 40 MIN: CPT | Performed by: INTERNAL MEDICINE

## 2017-12-19 RX ORDER — NYSTATIN 100000 U/G
CREAM TOPICAL 2 TIMES DAILY
COMMUNITY

## 2017-12-19 RX ORDER — TRAMADOL HYDROCHLORIDE 50 MG/1
50 TABLET ORAL
COMMUNITY
Start: 2017-12-26 | End: 2018-01-01

## 2017-12-19 ASSESSMENT — ENCOUNTER SYMPTOMS
EYE PAIN: 0
SPEECH CHANGE: 0
VOMITING: 0
PND: 0
CLAUDICATION: 0
ABDOMINAL PAIN: 0
FEVER: 0
EYE DISCHARGE: 0
PALPITATIONS: 0
NAUSEA: 0
SHORTNESS OF BREATH: 0
COUGH: 0
ORTHOPNEA: 0
BLURRED VISION: 0
HEADACHES: 0
SENSORY CHANGE: 0
BLOOD IN STOOL: 0
LOSS OF CONSCIOUSNESS: 0
FALLS: 0
BRUISES/BLEEDS EASILY: 0
HALLUCINATIONS: 0
WEIGHT LOSS: 0
DIZZINESS: 0
MYALGIAS: 0
DOUBLE VISION: 0
DEPRESSION: 0
CHILLS: 0

## 2017-12-19 NOTE — LETTER
[facility]   [address] [place] [address] [address]  [place] [address]  Phone: [phone]  Fax: [phone]              Noemi Gutierrez  3/6/1933    Encounter Date: 12/19/2017    Marcia Cabrera M.D.          PROGRESS NOTE:  Subjective:   Noemi Gutierrez is a 84 y.o. female who presents today for cardiac evaluation of severe AS (mean gradient of 42 mm Hg on TTE). She is living in [facility] now. Patient does report of having lightheadedness and dyspnea on exertion. But the most bothersome symptoms are her LE weakness from back problems.She is in wheel chair most of the time. Some feet swelling. In the past, several times we brought up the idea of TAVR but patient did not want to pursue that option. She would like to die a natural death. Patient has not been moving around much however. She is quite deconditioned.     Since last visit, Her cognitive function has been declining per her daughter's report.    Past Medical History:   Diagnosis Date   • Arthritis    • ASTHMA     sinus problems   • Backpain    • Breast cancer (CMS-HCC) 11/3/2010   • Cancer (CMS-HCC) 2009    right breast, radiation   • CATARACT    • Dental disorder     from radiation tx   • Dyslipidemia 11/3/2010   • Eczema 12/1/2010   • Heart murmur 11/10/2015   • Hiatus hernia syndrome    • HTN (hypertension) 11/3/2010   • Hypertension    • Indigestion    • Osteopenia 11/3/2010   • Stroke (CMS-HCC) 11/2010    mild right sided weakness   • Ulcer (CMS-[facility]) 2001    bleeding; requiring blood transfusion   • Unspecified urinary incontinence      Past Surgical History:   Procedure Laterality Date   • LUMBAR FUSION POSTERIOR  6/13/2013    Performed by Gilbert Lee M.D. at SURGERY [facility] ORS   • LUMBAR DECOMPRESSION  6/13/2013    Performed by Gilbert Lee M.D. at SURGERY [facility] ORS   • COLONOSCOPY FLEX W/ENDO US  1/18/2011    Performed by ELVIN LUCIO at SURGERY [facility]   • LUMPECTOMY   10/2009    right   • INGUINAL HERNIA REPAIR  2006    right   • LAMINOTOMY  2004   • APPENDECTOMY CHILD     • OTHER ORTHOPEDIC SURGERY     • TONSILLECTOMY  as child     Family History   Problem Relation Age of Onset   • Heart Disease     • Hypertension     • Stroke     • Cancer       History   Smoking Status   • Never Smoker   Smokeless Tobacco   • Never Used     Allergies   Allergen Reactions   • Codeine Itching   • Lisinopril Cough   • Sulfa Drugs Itching     Outpatient Encounter Prescriptions as of 12/19/2017   Medication Sig Dispense Refill   • tramadol (ULTRAM) 50 MG Tab Take 50 mg by mouth every four hours as needed.     • nystatin (MYCOSTATIN) 517816 UNIT/GM Cream topical cream Apply  to affected area(s) 2 times a day.     • senna-docusate (PERICOLACE OR SENOKOT S) 8.6-50 MG Tab Take 1 Tab by mouth every day.     • duloxetine (CYMBALTA) 30 MG Cap DR Particles Take 30 mg by mouth every day.     • Ferrous Fumarate 325 (106 FE) MG Tab Take  by mouth.     • gabapentin (NEURONTIN) 100 MG Cap Take 100 mg by mouth every day.     • latanoprost (XALATAN) 0.005 % Solution Place 1 Drop in both eyes every evening.     • melatonin 3 MG Tab Take 3 mg by mouth every bedtime.     • omeprazole (PRILOSEC) 20 MG delayed-release capsule Take 20 mg by mouth every day.     • acetaminophen (TYLENOL) 500 MG Tab Take 500-1,000 mg by mouth every 6 hours as needed.     • Cyanocobalamin (VITAMIN B-12) 1000 MCG Tab Take 1,000 mcg by mouth every day.     • Cholecalciferol (VITAMIN D3) 5000 UNITS Tab Take  by mouth.     • nitrofurantoin monohydr macro (MACROBID) 100 MG Cap Take 100 mg by mouth every day.     • aspirin EC (ECOTRIN) 81 MG Tablet Delayed Response Take 81 mg by mouth every day.     • lisinopril (PRINIVIL) 5 MG Tab Take 5 mg by mouth every day.     • cefTRIAXone (ROCEPHIN) 1 GM Recon Soln 1 g by Intramuscular route every day.     • ciprofloxacin (CIPRO) 500 MG Tab Take 500 mg by mouth 2 times a day.     • oxycodone-acetaminophen  "(PERCOCET) 5-325 MG Tab Take 1-2 Tabs by mouth every four hours as needed.     • furosemide (LASIX) 20 MG Tab Take 1 Tab by mouth as needed. 30 Tab 0   • alprazolam (XANAX) 0.5 MG Tab Take 0.5 mg by mouth at bedtime as needed for Sleep.     • simvastatin (ZOCOR) 20 MG TABS Take 1 Tab by mouth every evening. 30 Tab      No facility-administered encounter medications on file as of 12/19/2017.      Review of Systems   Constitutional: Negative for chills, fever, malaise/fatigue and weight loss.   HENT: Negative for ear discharge, ear pain, hearing loss and nosebleeds.    Eyes: Negative for blurred vision, double vision, pain and discharge.   Respiratory: Negative for cough and shortness of breath.    Cardiovascular: Negative for chest pain, palpitations, orthopnea, claudication, leg swelling and PND.   Gastrointestinal: Negative for abdominal pain, blood in stool, melena, nausea and vomiting.   Genitourinary: Negative for dysuria and hematuria.   Musculoskeletal: Negative for falls, joint pain and myalgias.   Skin: Negative for itching and rash.   Neurological: Negative for dizziness, sensory change, speech change, loss of consciousness and headaches.   Endo/Heme/Allergies: Negative for environmental allergies. Does not bruise/bleed easily.   Psychiatric/Behavioral: Negative for depression, hallucinations and suicidal ideas.        Objective:   /68   Pulse 88   Ht 1.626 m (5' 4\")   Wt 63 kg (139 lb)   SpO2 95%   BMI 23.86 kg/m²      Physical Exam   Constitutional: She is oriented to person, place, and time. No distress.   HENT:   Head: Normocephalic and atraumatic.   Eyes: EOM are normal.   Neck: No JVD present.   Cardiovascular: Normal rate, regular rhythm, normal heart sounds and intact distal pulses.  Exam reveals no gallop and no friction rub.    No murmur heard.  Pulmonary/Chest: No respiratory distress. She has no wheezes. She has no rales. She exhibits no tenderness.   Abdominal: She exhibits no " distension. There is no tenderness. There is no rebound and no guarding.   Musculoskeletal: She exhibits no edema or tenderness.   Lymphadenopathy:     She has no cervical adenopathy.   Neurological: She is alert and oriented to person, place, and time.   Skin: Skin is dry.   Psychiatric: She has a normal mood and affect.   Nursing note and vitals reviewed.      Assessment:     1. Aortic stenosis, severe  REFERRAL TO HOSPICE   2. Essential hypertension     3. Dyslipidemia     4. Spinal stenosis, lumbar region, without neurogenic claudication     5. Left ventricular diastolic dysfunction, NYHA class 3         Medical Decision Making:  Today's Assessment / Status / Plan:   I again had a face-to-face conversation with her daughter. He minutes about end-of-life issue. At this time, her daughter has agreed to proceed with hospice care. I think this is appropriate. She has advanced valvular heart disease. It is symptomatic. At this time, she is also experiencing cognitive decline. We will refer her to hospice.      Rafael Medley M.D.  [address]  [address]  [place] [address]  VIA Mail

## 2017-12-20 ENCOUNTER — HOSPICE ADMISSION (OUTPATIENT)
Dept: HOSPICE | Facility: HOSPICE | Age: 82
End: 2017-12-20
Payer: MEDICARE

## 2017-12-20 ENCOUNTER — HOME CARE VISIT (OUTPATIENT)
Dept: HOSPICE | Facility: HOSPICE | Age: 82
End: 2017-12-20
Payer: MEDICARE

## 2017-12-20 NOTE — PROGRESS NOTES
Subjective:   Noemi Gutierrez is a 84 y.o. female who presents today for cardiac evaluation of severe AS (mean gradient of 42 mm Hg on TTE). She is living in [facility] now. Patient does report of having lightheadedness and dyspnea on exertion. But the most bothersome symptoms are her LE weakness from back problems.She is in wheel chair most of the time. Some feet swelling. In the past, several times we brought up the idea of TAVR but patient did not want to pursue that option. She would like to die a natural death. Patient has not been moving around much however. She is quite deconditioned.     Since last visit, Her cognitive function has been declining per her daughter's report.    Past Medical History:   Diagnosis Date   • Arthritis    • ASTHMA     sinus problems   • Backpain    • Breast cancer ([facility]) 11/3/2010   • Cancer ([facility]) 2009    right breast, radiation   • CATARACT    • Dental disorder     from radiation tx   • Dyslipidemia 11/3/2010   • Eczema 12/1/2010   • Heart murmur 11/10/2015   • Hiatus hernia syndrome    • HTN (hypertension) 11/3/2010   • Hypertension    • Indigestion    • Osteopenia 11/3/2010   • Stroke (CMS-HCC) 11/2010    mild right sided weakness   • Ulcer (CMS-[facility]) 2001    bleeding; requiring blood transfusion   • Unspecified urinary incontinence      Past Surgical History:   Procedure Laterality Date   • LUMBAR FUSION POSTERIOR  6/13/2013    Performed by Gilbert Lee M.D. at SURGERY [facility] ORS   • LUMBAR DECOMPRESSION  6/13/2013    Performed by Gilbert Lee M.D. at SURGERY [facility] ORS   • COLONOSCOPY FLEX W/ENDO US  1/18/2011    Performed by ELVIN LUCIO at SURGERY [facility] ORS   • LUMPECTOMY  10/2009    right   • INGUINAL HERNIA REPAIR  2006    right   • LAMINOTOMY  2004   • APPENDECTOMY CHILD     • OTHER ORTHOPEDIC SURGERY     • TONSILLECTOMY  as child     Family History   Problem Relation Age of Onset   • Heart Disease     • Hypertension     • Stroke     •  Cancer       History   Smoking Status   • Never Smoker   Smokeless Tobacco   • Never Used     Allergies   Allergen Reactions   • Codeine Itching   • Lisinopril Cough   • Sulfa Drugs Itching     Outpatient Encounter Prescriptions as of 12/19/2017   Medication Sig Dispense Refill   • tramadol (ULTRAM) 50 MG Tab Take 50 mg by mouth every four hours as needed.     • nystatin (MYCOSTATIN) 990509 UNIT/GM Cream topical cream Apply  to affected area(s) 2 times a day.     • senna-docusate (PERICOLACE OR SENOKOT S) 8.6-50 MG Tab Take 1 Tab by mouth every day.     • duloxetine (CYMBALTA) 30 MG Cap DR Particles Take 30 mg by mouth every day.     • Ferrous Fumarate 325 (106 FE) MG Tab Take  by mouth.     • gabapentin (NEURONTIN) 100 MG Cap Take 100 mg by mouth every day.     • latanoprost (XALATAN) 0.005 % Solution Place 1 Drop in both eyes every evening.     • melatonin 3 MG Tab Take 3 mg by mouth every bedtime.     • omeprazole (PRILOSEC) 20 MG delayed-release capsule Take 20 mg by mouth every day.     • acetaminophen (TYLENOL) 500 MG Tab Take 500-1,000 mg by mouth every 6 hours as needed.     • Cyanocobalamin (VITAMIN B-12) 1000 MCG Tab Take 1,000 mcg by mouth every day.     • Cholecalciferol (VITAMIN D3) 5000 UNITS Tab Take  by mouth.     • nitrofurantoin monohydr macro (MACROBID) 100 MG Cap Take 100 mg by mouth every day.     • aspirin EC (ECOTRIN) 81 MG Tablet Delayed Response Take 81 mg by mouth every day.     • lisinopril (PRINIVIL) 5 MG Tab Take 5 mg by mouth every day.     • cefTRIAXone (ROCEPHIN) 1 GM Recon Soln 1 g by Intramuscular route every day.     • ciprofloxacin (CIPRO) 500 MG Tab Take 500 mg by mouth 2 times a day.     • oxycodone-acetaminophen (PERCOCET) 5-325 MG Tab Take 1-2 Tabs by mouth every four hours as needed.     • furosemide (LASIX) 20 MG Tab Take 1 Tab by mouth as needed. 30 Tab 0   • alprazolam (XANAX) 0.5 MG Tab Take 0.5 mg by mouth at bedtime as needed for Sleep.     • simvastatin (ZOCOR) 20 MG  "TABS Take 1 Tab by mouth every evening. 30 Tab      No facility-administered encounter medications on file as of 12/19/2017.      Review of Systems   Constitutional: Negative for chills, fever, malaise/fatigue and weight loss.   HENT: Negative for ear discharge, ear pain, hearing loss and nosebleeds.    Eyes: Negative for blurred vision, double vision, pain and discharge.   Respiratory: Negative for cough and shortness of breath.    Cardiovascular: Negative for chest pain, palpitations, orthopnea, claudication, leg swelling and PND.   Gastrointestinal: Negative for abdominal pain, blood in stool, melena, nausea and vomiting.   Genitourinary: Negative for dysuria and hematuria.   Musculoskeletal: Negative for falls, joint pain and myalgias.   Skin: Negative for itching and rash.   Neurological: Negative for dizziness, sensory change, speech change, loss of consciousness and headaches.   Endo/Heme/Allergies: Negative for environmental allergies. Does not bruise/bleed easily.   Psychiatric/Behavioral: Negative for depression, hallucinations and suicidal ideas.        Objective:   /68   Pulse 88   Ht 1.626 m (5' 4\")   Wt 63 kg (139 lb)   SpO2 95%   BMI 23.86 kg/m²     Physical Exam   Constitutional: She is oriented to person, place, and time. No distress.   HENT:   Head: Normocephalic and atraumatic.   Eyes: EOM are normal.   Neck: No JVD present.   Cardiovascular: Normal rate, regular rhythm, normal heart sounds and intact distal pulses.  Exam reveals no gallop and no friction rub.    No murmur heard.  Pulmonary/Chest: No respiratory distress. She has no wheezes. She has no rales. She exhibits no tenderness.   Abdominal: She exhibits no distension. There is no tenderness. There is no rebound and no guarding.   Musculoskeletal: She exhibits no edema or tenderness.   Lymphadenopathy:     She has no cervical adenopathy.   Neurological: She is alert and oriented to person, place, and time.   Skin: Skin is dry. "   Psychiatric: She has a normal mood and affect.   Nursing note and vitals reviewed.      Assessment:     1. Aortic stenosis, severe  REFERRAL TO HOSPICE   2. Essential hypertension     3. Dyslipidemia     4. Spinal stenosis, lumbar region, without neurogenic claudication     5. Left ventricular diastolic dysfunction, NYHA class 3         Medical Decision Making:  Today's Assessment / Status / Plan:   I again had a face-to-face conversation with her daughter. He minutes about end-of-life issue. At this time, her daughter has agreed to proceed with hospice care. I think this is appropriate. She has advanced valvular heart disease. It is symptomatic. At this time, she is also experiencing cognitive decline. We will refer her to hospice.

## 2017-12-21 ENCOUNTER — HOME CARE VISIT (OUTPATIENT)
Dept: HOSPICE | Facility: HOSPICE | Age: 82
End: 2017-12-21
Payer: MEDICARE

## 2017-12-26 ENCOUNTER — HOME CARE VISIT (OUTPATIENT)
Dept: HOSPICE | Facility: HOSPICE | Age: 82
End: 2017-12-26
Payer: MEDICARE

## 2017-12-26 VITALS — SYSTOLIC BLOOD PRESSURE: 127 MMHG | RESPIRATION RATE: 20 BRPM | HEART RATE: 86 BPM | DIASTOLIC BLOOD PRESSURE: 84 MMHG

## 2017-12-26 PROCEDURE — S9126 HOSPICE CARE, IN THE HOME, P: HCPCS

## 2017-12-26 PROCEDURE — 665035 HSPC ROOM AND BOARD PER DIEM

## 2017-12-26 PROCEDURE — 665036 HSPC NOTICE OF ELECTION NOE

## 2017-12-26 PROCEDURE — G0299 HHS/HOSPICE OF RN EA 15 MIN: HCPCS

## 2017-12-26 SDOH — ECONOMIC STABILITY: HOUSING INSECURITY: UNSAFE COOKING RANGE AREA: 0

## 2017-12-26 SDOH — ECONOMIC STABILITY: GENERAL

## 2017-12-26 SDOH — ECONOMIC STABILITY: HOUSING INSECURITY: UNSAFE APPLIANCES: 0

## 2017-12-26 ASSESSMENT — ENCOUNTER SYMPTOMS
BOWEL PATTERN NORMAL: 1
DESCRIPTION OF MEMORY LOSS: IMMEDIATE
SHORTNESS OF BREATH: 1
SLEEP QUALITY: ADEQUATE
LAST BOWEL MOVEMENT: 64641
STOOL FREQUENCY: LESS THAN DAILY
DYSPNEA ACTIVITY LEVEL: AFTER AMBULATING LESS THAN 10 FT
DYSPNEA ON EXERTION: 1
DESCRIPTION OF MEMORY LOSS: LONG TERM
SYNCOPE: 1
DESCRIPTION OF MEMORY LOSS: SHORT TERM
DRY SKIN: 1
FATIGUES EASILY: 1

## 2017-12-26 ASSESSMENT — ACTIVITIES OF DAILY LIVING (ADL): MONEY MANAGEMENT (EXPENSES/BILLS): TOTALLY DEPENDENT

## 2017-12-26 ASSESSMENT — SOCIAL DETERMINANTS OF HEALTH (SDOH): ACTIVE STRESSOR - NO STRESS FACTORS: 1

## 2017-12-27 PROCEDURE — S9126 HOSPICE CARE, IN THE HOME, P: HCPCS

## 2017-12-27 PROCEDURE — 665035 HSPC ROOM AND BOARD PER DIEM

## 2017-12-28 ENCOUNTER — HOME CARE VISIT (OUTPATIENT)
Dept: HOSPICE | Facility: HOSPICE | Age: 82
End: 2017-12-28
Payer: MEDICARE

## 2017-12-28 VITALS — HEART RATE: 74 BPM | RESPIRATION RATE: 20 BRPM | OXYGEN SATURATION: 89 %

## 2017-12-28 PROCEDURE — G0299 HHS/HOSPICE OF RN EA 15 MIN: HCPCS

## 2017-12-28 PROCEDURE — 665035 HSPC ROOM AND BOARD PER DIEM

## 2017-12-28 PROCEDURE — S9126 HOSPICE CARE, IN THE HOME, P: HCPCS

## 2017-12-28 ASSESSMENT — ENCOUNTER SYMPTOMS
CONSTIPATION: 1
HYPOTENSION: 1
COUGH CHARACTERISTICS: PRODUCTIVE
SPUTUM AMOUNT: SCANT
SPUTUM COLOR: CLEAR
SLEEP QUALITY: ADEQUATE
FATIGUES EASILY: 1
COUGH CHARACTERISTICS: MOIST
DESCRIPTION OF MEMORY LOSS: SHORT TERM
LOWER EXTREMITY EDEMA: 1
DRY SKIN: 1
LAST BOWEL MOVEMENT: 64642
DYSPNEA ON EXERTION: 1
STOOL FREQUENCY: LESS THAN DAILY
SPUTUM PRODUCTION: 1
COUGH: 1
FATIGUE: 1
SPUTUM CONSISTENCY: THIN

## 2017-12-28 ASSESSMENT — SOCIAL DETERMINANTS OF HEALTH (SDOH): ACTIVE STRESSOR - HEALTH CHANGES: 1

## 2017-12-29 ENCOUNTER — HOME CARE VISIT (OUTPATIENT)
Dept: HOSPICE | Facility: HOSPICE | Age: 82
End: 2017-12-29
Payer: MEDICARE

## 2017-12-29 PROCEDURE — 665035 HSPC ROOM AND BOARD PER DIEM

## 2017-12-29 PROCEDURE — S9126 HOSPICE CARE, IN THE HOME, P: HCPCS

## 2017-12-29 PROCEDURE — G0156 HHCP-SVS OF AIDE,EA 15 MIN: HCPCS

## 2017-12-30 ENCOUNTER — HOME CARE VISIT (OUTPATIENT)
Dept: HOSPICE | Facility: HOSPICE | Age: 82
End: 2017-12-30
Payer: MEDICARE

## 2017-12-30 PROCEDURE — S9126 HOSPICE CARE, IN THE HOME, P: HCPCS

## 2017-12-30 PROCEDURE — G0156 HHCP-SVS OF AIDE,EA 15 MIN: HCPCS

## 2017-12-30 PROCEDURE — 665035 HSPC ROOM AND BOARD PER DIEM

## 2017-12-31 PROCEDURE — S9126 HOSPICE CARE, IN THE HOME, P: HCPCS

## 2017-12-31 PROCEDURE — 665035 HSPC ROOM AND BOARD PER DIEM

## 2018-01-01 ENCOUNTER — APPOINTMENT (OUTPATIENT)
Dept: HOSPICE | Facility: HOSPICE | Age: 83
End: 2018-01-01
Payer: MEDICARE

## 2018-01-01 ENCOUNTER — HOME CARE VISIT (OUTPATIENT)
Dept: HOSPICE | Facility: HOSPICE | Age: 83
End: 2018-01-01
Payer: MEDICARE

## 2018-01-01 VITALS
HEART RATE: 62 BPM | SYSTOLIC BLOOD PRESSURE: 95 MMHG | RESPIRATION RATE: 16 BRPM | DIASTOLIC BLOOD PRESSURE: 72 MMHG | WEIGHT: 116 LBS | BODY MASS INDEX: 19.91 KG/M2

## 2018-01-01 VITALS — HEART RATE: 83 BPM | SYSTOLIC BLOOD PRESSURE: 101 MMHG | DIASTOLIC BLOOD PRESSURE: 54 MMHG

## 2018-01-01 VITALS
HEART RATE: 92 BPM | DIASTOLIC BLOOD PRESSURE: 70 MMHG | BODY MASS INDEX: 20.91 KG/M2 | SYSTOLIC BLOOD PRESSURE: 101 MMHG | HEIGHT: 63 IN | OXYGEN SATURATION: 91 % | WEIGHT: 118 LBS

## 2018-01-01 VITALS
RESPIRATION RATE: 16 BRPM | WEIGHT: 124 LBS | BODY MASS INDEX: 21.18 KG/M2 | HEART RATE: 70 BPM | WEIGHT: 123.38 LBS | OXYGEN SATURATION: 92 % | RESPIRATION RATE: 20 BRPM | BODY MASS INDEX: 21.28 KG/M2 | DIASTOLIC BLOOD PRESSURE: 52 MMHG | HEART RATE: 66 BPM | DIASTOLIC BLOOD PRESSURE: 50 MMHG | SYSTOLIC BLOOD PRESSURE: 92 MMHG | SYSTOLIC BLOOD PRESSURE: 90 MMHG | OXYGEN SATURATION: 88 %

## 2018-01-01 VITALS
SYSTOLIC BLOOD PRESSURE: 123 MMHG | BODY MASS INDEX: 21.35 KG/M2 | RESPIRATION RATE: 20 BRPM | OXYGEN SATURATION: 97 % | HEART RATE: 74 BPM | DIASTOLIC BLOOD PRESSURE: 74 MMHG | WEIGHT: 124.38 LBS

## 2018-01-01 VITALS
SYSTOLIC BLOOD PRESSURE: 92 MMHG | HEART RATE: 91 BPM | DIASTOLIC BLOOD PRESSURE: 54 MMHG | RESPIRATION RATE: 24 BRPM | OXYGEN SATURATION: 85 %

## 2018-01-01 VITALS
HEART RATE: 112 BPM | RESPIRATION RATE: 20 BRPM | DIASTOLIC BLOOD PRESSURE: 52 MMHG | OXYGEN SATURATION: 84 % | SYSTOLIC BLOOD PRESSURE: 97 MMHG

## 2018-01-01 VITALS
SYSTOLIC BLOOD PRESSURE: 103 MMHG | WEIGHT: 122.13 LBS | SYSTOLIC BLOOD PRESSURE: 129 MMHG | HEART RATE: 59 BPM | RESPIRATION RATE: 16 BRPM | OXYGEN SATURATION: 97 % | DIASTOLIC BLOOD PRESSURE: 68 MMHG | RESPIRATION RATE: 20 BRPM | BODY MASS INDEX: 20.96 KG/M2 | DIASTOLIC BLOOD PRESSURE: 85 MMHG | HEART RATE: 72 BPM

## 2018-01-01 VITALS — DIASTOLIC BLOOD PRESSURE: 86 MMHG | HEART RATE: 80 BPM | RESPIRATION RATE: 20 BRPM | SYSTOLIC BLOOD PRESSURE: 129 MMHG

## 2018-01-01 VITALS
SYSTOLIC BLOOD PRESSURE: 100 MMHG | OXYGEN SATURATION: 97 % | RESPIRATION RATE: 16 BRPM | DIASTOLIC BLOOD PRESSURE: 57 MMHG | HEART RATE: 63 BPM

## 2018-01-01 VITALS
OXYGEN SATURATION: 97 % | HEART RATE: 82 BPM | SYSTOLIC BLOOD PRESSURE: 94 MMHG | DIASTOLIC BLOOD PRESSURE: 52 MMHG | RESPIRATION RATE: 20 BRPM

## 2018-01-01 VITALS
SYSTOLIC BLOOD PRESSURE: 112 MMHG | RESPIRATION RATE: 20 BRPM | DIASTOLIC BLOOD PRESSURE: 58 MMHG | HEART RATE: 59 BPM | OXYGEN SATURATION: 97 %

## 2018-01-01 VITALS — SYSTOLIC BLOOD PRESSURE: 89 MMHG | RESPIRATION RATE: 18 BRPM | HEART RATE: 56 BPM | DIASTOLIC BLOOD PRESSURE: 56 MMHG

## 2018-01-01 VITALS — HEART RATE: 70 BPM | DIASTOLIC BLOOD PRESSURE: 78 MMHG | SYSTOLIC BLOOD PRESSURE: 110 MMHG | RESPIRATION RATE: 20 BRPM

## 2018-01-01 VITALS
HEART RATE: 74 BPM | DIASTOLIC BLOOD PRESSURE: 52 MMHG | SYSTOLIC BLOOD PRESSURE: 94 MMHG | RESPIRATION RATE: 16 BRPM | OXYGEN SATURATION: 97 %

## 2018-01-01 VITALS
SYSTOLIC BLOOD PRESSURE: 92 MMHG | HEART RATE: 56 BPM | DIASTOLIC BLOOD PRESSURE: 56 MMHG | RESPIRATION RATE: 20 BRPM | OXYGEN SATURATION: 92 %

## 2018-01-01 VITALS
HEART RATE: 65 BPM | BODY MASS INDEX: 20.49 KG/M2 | WEIGHT: 119.38 LBS | DIASTOLIC BLOOD PRESSURE: 65 MMHG | RESPIRATION RATE: 16 BRPM | SYSTOLIC BLOOD PRESSURE: 99 MMHG

## 2018-01-01 VITALS
HEART RATE: 64 BPM | DIASTOLIC BLOOD PRESSURE: 76 MMHG | BODY MASS INDEX: 19.93 KG/M2 | WEIGHT: 116.13 LBS | SYSTOLIC BLOOD PRESSURE: 100 MMHG

## 2018-01-01 VITALS
BODY MASS INDEX: 20.77 KG/M2 | SYSTOLIC BLOOD PRESSURE: 100 MMHG | WEIGHT: 121 LBS | DIASTOLIC BLOOD PRESSURE: 57 MMHG | RESPIRATION RATE: 16 BRPM | OXYGEN SATURATION: 99 % | HEART RATE: 77 BPM

## 2018-01-01 VITALS
HEART RATE: 62 BPM | OXYGEN SATURATION: 97 % | SYSTOLIC BLOOD PRESSURE: 106 MMHG | RESPIRATION RATE: 16 BRPM | DIASTOLIC BLOOD PRESSURE: 64 MMHG

## 2018-01-01 VITALS — DIASTOLIC BLOOD PRESSURE: 51 MMHG | HEART RATE: 78 BPM | SYSTOLIC BLOOD PRESSURE: 92 MMHG | OXYGEN SATURATION: 97 %

## 2018-01-01 VITALS
RESPIRATION RATE: 16 BRPM | DIASTOLIC BLOOD PRESSURE: 60 MMHG | OXYGEN SATURATION: 95 % | TEMPERATURE: 98.4 F | SYSTOLIC BLOOD PRESSURE: 98 MMHG | HEART RATE: 73 BPM

## 2018-01-01 VITALS
OXYGEN SATURATION: 88 % | HEART RATE: 90 BPM | BODY MASS INDEX: 19.91 KG/M2 | SYSTOLIC BLOOD PRESSURE: 87 MMHG | RESPIRATION RATE: 16 BRPM | WEIGHT: 116 LBS | DIASTOLIC BLOOD PRESSURE: 55 MMHG

## 2018-01-01 VITALS — RESPIRATION RATE: 20 BRPM | DIASTOLIC BLOOD PRESSURE: 63 MMHG | HEART RATE: 50 BPM | SYSTOLIC BLOOD PRESSURE: 103 MMHG

## 2018-01-01 VITALS
SYSTOLIC BLOOD PRESSURE: 108 MMHG | RESPIRATION RATE: 18 BRPM | DIASTOLIC BLOOD PRESSURE: 66 MMHG | OXYGEN SATURATION: 88 % | HEART RATE: 84 BPM | TEMPERATURE: 98.7 F

## 2018-01-01 VITALS
RESPIRATION RATE: 16 BRPM | BODY MASS INDEX: 20.95 KG/M2 | WEIGHT: 118.25 LBS | OXYGEN SATURATION: 97 % | DIASTOLIC BLOOD PRESSURE: 60 MMHG | SYSTOLIC BLOOD PRESSURE: 98 MMHG

## 2018-01-01 VITALS
DIASTOLIC BLOOD PRESSURE: 63 MMHG | HEART RATE: 73 BPM | RESPIRATION RATE: 16 BRPM | OXYGEN SATURATION: 97 % | SYSTOLIC BLOOD PRESSURE: 101 MMHG

## 2018-01-01 VITALS
OXYGEN SATURATION: 97 % | HEART RATE: 60 BPM | SYSTOLIC BLOOD PRESSURE: 104 MMHG | DIASTOLIC BLOOD PRESSURE: 62 MMHG | RESPIRATION RATE: 16 BRPM

## 2018-01-01 VITALS
HEART RATE: 67 BPM | RESPIRATION RATE: 16 BRPM | DIASTOLIC BLOOD PRESSURE: 72 MMHG | SYSTOLIC BLOOD PRESSURE: 106 MMHG | OXYGEN SATURATION: 92 %

## 2018-01-01 VITALS
SYSTOLIC BLOOD PRESSURE: 102 MMHG | BODY MASS INDEX: 20.45 KG/M2 | WEIGHT: 119.13 LBS | DIASTOLIC BLOOD PRESSURE: 76 MMHG | HEART RATE: 69 BPM

## 2018-01-01 VITALS
OXYGEN SATURATION: 98 % | DIASTOLIC BLOOD PRESSURE: 57 MMHG | HEART RATE: 71 BPM | SYSTOLIC BLOOD PRESSURE: 103 MMHG | RESPIRATION RATE: 16 BRPM

## 2018-01-01 VITALS
WEIGHT: 122.13 LBS | HEART RATE: 70 BPM | BODY MASS INDEX: 20.96 KG/M2 | RESPIRATION RATE: 18 BRPM | SYSTOLIC BLOOD PRESSURE: 98 MMHG | DIASTOLIC BLOOD PRESSURE: 64 MMHG

## 2018-01-01 VITALS
RESPIRATION RATE: 20 BRPM | SYSTOLIC BLOOD PRESSURE: 98 MMHG | HEART RATE: 56 BPM | DIASTOLIC BLOOD PRESSURE: 64 MMHG | OXYGEN SATURATION: 97 %

## 2018-01-01 VITALS
HEART RATE: 117 BPM | OXYGEN SATURATION: 85 % | WEIGHT: 116 LBS | RESPIRATION RATE: 16 BRPM | BODY MASS INDEX: 19.91 KG/M2 | DIASTOLIC BLOOD PRESSURE: 52 MMHG | SYSTOLIC BLOOD PRESSURE: 87 MMHG

## 2018-01-01 VITALS
OXYGEN SATURATION: 88 % | RESPIRATION RATE: 16 BRPM | HEART RATE: 67 BPM | SYSTOLIC BLOOD PRESSURE: 125 MMHG | DIASTOLIC BLOOD PRESSURE: 81 MMHG

## 2018-01-01 VITALS — DIASTOLIC BLOOD PRESSURE: 54 MMHG | SYSTOLIC BLOOD PRESSURE: 90 MMHG | WEIGHT: 117.31 LBS | BODY MASS INDEX: 20.14 KG/M2

## 2018-01-01 VITALS
HEIGHT: 63 IN | WEIGHT: 116 LBS | BODY MASS INDEX: 20.55 KG/M2 | SYSTOLIC BLOOD PRESSURE: 92 MMHG | HEART RATE: 62 BPM | DIASTOLIC BLOOD PRESSURE: 50 MMHG

## 2018-01-01 VITALS
OXYGEN SATURATION: 99 % | RESPIRATION RATE: 24 BRPM | SYSTOLIC BLOOD PRESSURE: 97 MMHG | HEART RATE: 80 BPM | DIASTOLIC BLOOD PRESSURE: 68 MMHG

## 2018-01-01 VITALS
DIASTOLIC BLOOD PRESSURE: 73 MMHG | RESPIRATION RATE: 16 BRPM | OXYGEN SATURATION: 93 % | SYSTOLIC BLOOD PRESSURE: 120 MMHG | HEART RATE: 73 BPM

## 2018-01-01 VITALS
OXYGEN SATURATION: 98 % | HEART RATE: 81 BPM | SYSTOLIC BLOOD PRESSURE: 96 MMHG | RESPIRATION RATE: 16 BRPM | DIASTOLIC BLOOD PRESSURE: 70 MMHG

## 2018-01-01 VITALS — SYSTOLIC BLOOD PRESSURE: 107 MMHG | OXYGEN SATURATION: 97 % | HEART RATE: 71 BPM | DIASTOLIC BLOOD PRESSURE: 78 MMHG

## 2018-01-01 VITALS
OXYGEN SATURATION: 93 % | HEART RATE: 90 BPM | DIASTOLIC BLOOD PRESSURE: 63 MMHG | SYSTOLIC BLOOD PRESSURE: 116 MMHG | RESPIRATION RATE: 20 BRPM

## 2018-01-01 VITALS
SYSTOLIC BLOOD PRESSURE: 99 MMHG | OXYGEN SATURATION: 88 % | DIASTOLIC BLOOD PRESSURE: 71 MMHG | HEART RATE: 68 BPM | RESPIRATION RATE: 20 BRPM

## 2018-01-01 VITALS
RESPIRATION RATE: 16 BRPM | DIASTOLIC BLOOD PRESSURE: 62 MMHG | OXYGEN SATURATION: 85 % | SYSTOLIC BLOOD PRESSURE: 100 MMHG | HEART RATE: 79 BPM

## 2018-01-01 VITALS — WEIGHT: 120 LBS | BODY MASS INDEX: 20.6 KG/M2

## 2018-01-01 VITALS — OXYGEN SATURATION: 90 % | SYSTOLIC BLOOD PRESSURE: 99 MMHG | DIASTOLIC BLOOD PRESSURE: 58 MMHG | HEART RATE: 57 BPM

## 2018-01-01 VITALS
HEART RATE: 87 BPM | WEIGHT: 116 LBS | SYSTOLIC BLOOD PRESSURE: 102 MMHG | RESPIRATION RATE: 16 BRPM | DIASTOLIC BLOOD PRESSURE: 67 MMHG | HEIGHT: 63 IN | BODY MASS INDEX: 20.55 KG/M2

## 2018-01-01 VITALS — HEART RATE: 84 BPM | OXYGEN SATURATION: 93 %

## 2018-01-01 VITALS — HEART RATE: 78 BPM | SYSTOLIC BLOOD PRESSURE: 92 MMHG | DIASTOLIC BLOOD PRESSURE: 78 MMHG | RESPIRATION RATE: 20 BRPM

## 2018-01-01 VITALS — SYSTOLIC BLOOD PRESSURE: 99 MMHG | OXYGEN SATURATION: 98 % | DIASTOLIC BLOOD PRESSURE: 52 MMHG | HEART RATE: 52 BPM

## 2018-01-01 VITALS
HEART RATE: 79 BPM | SYSTOLIC BLOOD PRESSURE: 92 MMHG | OXYGEN SATURATION: 92 % | DIASTOLIC BLOOD PRESSURE: 79 MMHG | RESPIRATION RATE: 20 BRPM

## 2018-01-01 VITALS
HEART RATE: 57 BPM | RESPIRATION RATE: 20 BRPM | OXYGEN SATURATION: 92 % | SYSTOLIC BLOOD PRESSURE: 103 MMHG | DIASTOLIC BLOOD PRESSURE: 62 MMHG

## 2018-01-01 VITALS
HEART RATE: 64 BPM | DIASTOLIC BLOOD PRESSURE: 73 MMHG | BODY MASS INDEX: 20.96 KG/M2 | RESPIRATION RATE: 18 BRPM | SYSTOLIC BLOOD PRESSURE: 108 MMHG | WEIGHT: 122.13 LBS

## 2018-01-01 VITALS — RESPIRATION RATE: 18 BRPM | HEART RATE: 62 BPM | OXYGEN SATURATION: 98 %

## 2018-01-01 VITALS — HEART RATE: 70 BPM | SYSTOLIC BLOOD PRESSURE: 120 MMHG | RESPIRATION RATE: 14 BRPM | DIASTOLIC BLOOD PRESSURE: 75 MMHG

## 2018-01-01 VITALS
DIASTOLIC BLOOD PRESSURE: 75 MMHG | SYSTOLIC BLOOD PRESSURE: 101 MMHG | HEART RATE: 53 BPM | RESPIRATION RATE: 16 BRPM | OXYGEN SATURATION: 98 %

## 2018-01-01 VITALS
HEART RATE: 76 BPM | RESPIRATION RATE: 16 BRPM | OXYGEN SATURATION: 89 % | SYSTOLIC BLOOD PRESSURE: 92 MMHG | DIASTOLIC BLOOD PRESSURE: 50 MMHG

## 2018-01-01 VITALS
RESPIRATION RATE: 16 BRPM | SYSTOLIC BLOOD PRESSURE: 100 MMHG | OXYGEN SATURATION: 85 % | HEART RATE: 82 BPM | DIASTOLIC BLOOD PRESSURE: 61 MMHG

## 2018-01-01 VITALS — HEART RATE: 82 BPM | BODY MASS INDEX: 20.02 KG/M2 | WEIGHT: 113 LBS

## 2018-01-01 VITALS — DIASTOLIC BLOOD PRESSURE: 70 MMHG | SYSTOLIC BLOOD PRESSURE: 105 MMHG | RESPIRATION RATE: 16 BRPM

## 2018-01-01 VITALS
BODY MASS INDEX: 20.6 KG/M2 | OXYGEN SATURATION: 92 % | HEART RATE: 71 BPM | WEIGHT: 120 LBS | RESPIRATION RATE: 16 BRPM | SYSTOLIC BLOOD PRESSURE: 91 MMHG | DIASTOLIC BLOOD PRESSURE: 52 MMHG

## 2018-01-01 VITALS
OXYGEN SATURATION: 97 % | HEART RATE: 73 BPM | SYSTOLIC BLOOD PRESSURE: 124 MMHG | DIASTOLIC BLOOD PRESSURE: 80 MMHG | RESPIRATION RATE: 20 BRPM

## 2018-01-01 VITALS — HEART RATE: 72 BPM | DIASTOLIC BLOOD PRESSURE: 64 MMHG | SYSTOLIC BLOOD PRESSURE: 100 MMHG | RESPIRATION RATE: 18 BRPM

## 2018-01-01 VITALS — HEART RATE: 104 BPM | RESPIRATION RATE: 12 BRPM

## 2018-01-01 VITALS
SYSTOLIC BLOOD PRESSURE: 101 MMHG | DIASTOLIC BLOOD PRESSURE: 73 MMHG | RESPIRATION RATE: 16 BRPM | HEART RATE: 88 BPM | OXYGEN SATURATION: 88 %

## 2018-01-01 VITALS — RESPIRATION RATE: 28 BRPM | HEART RATE: 80 BPM

## 2018-01-01 VITALS
BODY MASS INDEX: 20.1 KG/M2 | WEIGHT: 117.13 LBS | HEART RATE: 64 BPM | DIASTOLIC BLOOD PRESSURE: 80 MMHG | SYSTOLIC BLOOD PRESSURE: 118 MMHG | RESPIRATION RATE: 16 BRPM

## 2018-01-01 VITALS
SYSTOLIC BLOOD PRESSURE: 121 MMHG | OXYGEN SATURATION: 99 % | RESPIRATION RATE: 16 BRPM | DIASTOLIC BLOOD PRESSURE: 88 MMHG | HEART RATE: 86 BPM

## 2018-01-01 VITALS — HEART RATE: 72 BPM | RESPIRATION RATE: 8 BRPM

## 2018-01-01 VITALS — BODY MASS INDEX: 20.79 KG/M2 | WEIGHT: 121.13 LBS

## 2018-01-01 PROCEDURE — S9126 HOSPICE CARE, IN THE HOME, P: HCPCS

## 2018-01-01 PROCEDURE — 665035 HSPC ROOM AND BOARD PER DIEM

## 2018-01-01 PROCEDURE — G0156 HHCP-SVS OF AIDE,EA 15 MIN: HCPCS

## 2018-01-01 PROCEDURE — G0299 HHS/HOSPICE OF RN EA 15 MIN: HCPCS

## 2018-01-01 PROCEDURE — G0155 HHCP-SVS OF CSW,EA 15 MIN: HCPCS

## 2018-01-01 PROCEDURE — 6650990 HC HOSPICE AND HOME CARE RX REV CODE 0250

## 2018-01-01 PROCEDURE — 6650387 HCR  NO RINSE SHAMPOO

## 2018-01-01 PROCEDURE — A5120 SKIN BARRIER, WIPE OR SWAB: HCPCS

## 2018-01-01 PROCEDURE — 6650403 HCR  SHAMPOO CAPS - EA

## 2018-01-01 SDOH — ECONOMIC STABILITY: HOUSING INSECURITY: UNSAFE APPLIANCES: 0

## 2018-01-01 SDOH — ECONOMIC STABILITY: GENERAL

## 2018-01-01 SDOH — ECONOMIC STABILITY: HOUSING INSECURITY: UNSAFE COOKING RANGE AREA: 0

## 2018-01-01 SDOH — ECONOMIC STABILITY: HOUSING INSECURITY: HOME SAFETY: PATIENT RESIDES IN A SKILLED FACILITY.

## 2018-01-01 SDOH — ECONOMIC STABILITY: HOUSING INSECURITY: HOME SAFETY: SAFETY PLAN DEFERS TO FACILITY AS PATIENT IN SKILLED NURSING FACILITY DOCUMENTS TO BE COMPLETED.

## 2018-01-01 SDOH — ECONOMIC STABILITY: GENERAL: NECESSITIES UNABLE TO AFFORD: CLOTHES

## 2018-01-01 SDOH — ECONOMIC STABILITY: HOUSING INSECURITY: HOME SAFETY: PATIENT RESIDES IN A LONG TERM SKILLED FACILITY

## 2018-01-01 SDOH — ECONOMIC STABILITY: HOUSING INSECURITY: HOME SAFETY: PATIENT LIVES IN SKILLED FACILITY AND SAFETY PROTOCOL IS IN PLACE AS WELL AS SHE IS BED TO W/C WITH ASSIST.

## 2018-01-01 ASSESSMENT — ENCOUNTER SYMPTOMS
DESCRIPTION OF MEMORY LOSS: IMMEDIATE
SLEEP QUALITY: ADEQUATE
DEPRESSED MOOD: 1
FATIGUE: 1
DESCRIPTION OF MEMORY LOSS: SHORT TERM
DRY SKIN: 1
DRY SKIN: 1
FATIGUE: 1
DRY SKIN: 1
LOWER EXTREMITY EDEMA: 1
STOOL FREQUENCY: DAILY
LOWER EXTREMITY EDEMA: 1
STOOL FREQUENCY: DAILY
COUGH CHARACTERISTICS: WEAK
DESCRIPTION OF MEMORY LOSS: IMMEDIATE
DEPRESSED MOOD: 1
FATIGUES EASILY: 1
DYSPNEA ON EXERTION: 1
STOOL FREQUENCY: LESS THAN DAILY
FATIGUE: 1
STOOL FREQUENCY: LESS THAN DAILY
COUGH: 1
DESCRIPTION OF MEMORY LOSS: LONG TERM
DESCRIPTION OF MEMORY LOSS: IMMEDIATE
DESCRIPTION OF MEMORY LOSS: SHORT TERM
FATIGUES EASILY: 1
STOOL FREQUENCY: LESS THAN DAILY
DESCRIPTION OF MEMORY LOSS: IMMEDIATE
FATIGUES EASILY: 1
COUGH: 1
STOOL FREQUENCY: DAILY
HYPOTENSION: 1
AGITATION: 1
FATIGUES EASILY: 1
STOOL FREQUENCY: DAILY
DYSPNEA ON EXERTION: 1
COUGH: 1
LOWER EXTREMITY EDEMA: 1
DYSPNEA ON EXERTION: 1
STOOL FREQUENCY: LESS THAN DAILY
DRY SKIN: 1
STOOL FREQUENCY: LESS THAN DAILY
BOWEL INCONTINENCE: 1
LOSS OF SENSATION: 1
ORTHOSTATIC HYPOTENSION: 1
FATIGUES EASILY: 1
SLEEP QUALITY: ADEQUATE
DESCRIPTION OF MEMORY LOSS: IMMEDIATE
BOWEL INCONTINENCE: 1
STOOL FREQUENCY: DAILY
DESCRIPTION OF MEMORY LOSS: SHORT TERM
STOOL FREQUENCY: LESS THAN DAILY
LOWER EXTREMITY EDEMA: 1
DESCRIPTION OF MEMORY LOSS: SHORT TERM
FATIGUES EASILY: 1
LAST BOWEL MOVEMENT: 64833
LOWER EXTREMITY EDEMA: 1
BOWEL INCONTINENCE: 1
SLEEP QUALITY: ADEQUATE
BOWEL INCONTINENCE: 1
COUGH CHARACTERISTICS: PRODUCTIVE
DESCRIPTION OF MEMORY LOSS: LONG TERM
DESCRIPTION OF MEMORY LOSS: IMMEDIATE
LAST BOWEL MOVEMENT: 64980
SOMNOLENCE: 1
STOOL FREQUENCY: LESS THAN DAILY
BOWEL PATTERN NORMAL: 1
SLEEP QUALITY: ADEQUATE
SLEEP QUALITY: FAIR
LAST BOWEL MOVEMENT: 64840
STOOL FREQUENCY: LESS THAN DAILY
LAST BOWEL MOVEMENT: 64860
FATIGUES EASILY: 1
STOOL FREQUENCY: LESS THAN DAILY
SLEEP QUALITY: ADEQUATE
BOWEL PATTERN NORMAL: 1
FORGETFULNESS: 1
STOOL FREQUENCY: DAILY
TREMORS: 1
BOWEL PATTERN NORMAL: 1
FORGETFULNESS: 1
HYPOTENSION: 1
FATIGUES EASILY: 1
BOWEL INCONTINENCE: 1
HYPOTENSION: 1
DYSPNEA ON EXERTION: 1
LAST BOWEL MOVEMENT: 64746
BOWEL PATTERN NORMAL: 1
LAST BOWEL MOVEMENT: 64875
HYPOTENSION: 1
SLEEP QUALITY: ADEQUATE
DRY SKIN: 1
LOSS OF SENSATION: 1
AGITATION: 1
FORGETFULNESS: 1
SLEEP QUALITY: ADEQUATE
LAST BOWEL MOVEMENT: 64845
FORGETFULNESS: 1
SLEEP QUALITY: ADEQUATE
FATIGUES EASILY: 1
DYSPNEA ON EXERTION: 1
SOMNOLENCE: 1
ADDITIONAL INFORMATION: BACK PAIN
LAST BOWEL MOVEMENT: 64812
BOWEL INCONTINENCE: 1
LAST BOWEL MOVEMENT: 64708
LOWER EXTREMITY EDEMA: 1
COUGH: 1
BOWEL INCONTINENCE: 1
LOWER EXTREMITY EDEMA: 1
STOOL FREQUENCY: LESS THAN DAILY
COUGH: 1
COUGH: 1
SLEEP QUALITY: ADEQUATE
SLEEP QUALITY: FAIR
DYSPNEA ON EXERTION: 1
SLEEP QUALITY: FAIR
BOWEL INCONTINENCE: 1
DESCRIPTION OF MEMORY LOSS: SHORT TERM
STOOL FREQUENCY: LESS THAN DAILY
FATIGUES EASILY: 1
BOWEL INCONTINENCE: 1
SLEEP QUALITY: FAIR
FATIGUE: 1
SLEEP QUALITY: FAIR
SLEEP QUALITY: ADEQUATE
LAST BOWEL MOVEMENT: 64854
FATIGUES EASILY: 1
COUGH CHARACTERISTICS: CONGESTED
BOWEL INCONTINENCE: 1
DESCRIPTION OF MEMORY LOSS: SHORT TERM
AGITATION: 1
LAST BOWEL MOVEMENT: 64785
BOWEL INCONTINENCE: 1
FORGETFULNESS: 1
HYPOTENSION: 1
STOOL FREQUENCY: LESS THAN DAILY
FATIGUES EASILY: 1
LOWER EXTREMITY EDEMA: 1
FATIGUE: 1
AGITATION: 1
STOOL FREQUENCY: LESS THAN DAILY
STOOL FREQUENCY: DAILY
SOMNOLENCE: 1
BOWEL PATTERN NORMAL: 1
FATIGUES EASILY: 1
DESCRIPTION OF MEMORY LOSS: SHORT TERM
SLEEP QUALITY: ADEQUATE
DESCRIPTION OF MEMORY LOSS: IMMEDIATE
LOWER EXTREMITY EDEMA: 1
FATIGUES EASILY: 1
STOOL FREQUENCY: LESS THAN DAILY
SLEEP QUALITY: FAIR
FATIGUE: 1
DEPRESSED MOOD: 1
FATIGUE: 1
DESCRIPTION OF MEMORY LOSS: LONG TERM
LAST BOWEL MOVEMENT: 64685
DYSPNEA ON EXERTION: 1
DESCRIPTION OF MEMORY LOSS: SHORT TERM
FLATUS: 1
STOOL FREQUENCY: LESS THAN DAILY
LAST BOWEL MOVEMENT: 64712
COUGH: 1
DESCRIPTION OF MEMORY LOSS: SHORT TERM
LAST BOWEL MOVEMENT: 64733
FATIGUE: 1
SLEEP QUALITY: FAIR
BOWEL INCONTINENCE: 1
FATIGUE: 1
STOOL FREQUENCY: LESS THAN DAILY
COUGH: 1
LAST BOWEL MOVEMENT: 64762
DEPRESSED MOOD: 1
BOWEL INCONTINENCE: 1
STOOL FREQUENCY: LESS THAN DAILY
DESCRIPTION OF MEMORY LOSS: IMMEDIATE
FORGETFULNESS: 1
LAST BOWEL MOVEMENT: 64851
DYSPNEA ON EXERTION: 1
DRY SKIN: 1
FLATUS: 1
STOOL FREQUENCY: LESS THAN DAILY
DESCRIPTION OF MEMORY LOSS: SHORT TERM
FATIGUES EASILY: 1
ADDITIONAL INFORMATION: DENIES
SLEEP QUALITY: ADEQUATE
DESCRIPTION OF MEMORY LOSS: LONG TERM
BOWEL INCONTINENCE: 1
DYSPNEA ON EXERTION: 1
FLATUS: 1
FATIGUES EASILY: 1
DESCRIPTION OF MEMORY LOSS: IMMEDIATE
COUGH: 1
SLEEP QUALITY: ADEQUATE
ADDITIONAL INFORMATION: NO PAIN
FORGETFULNESS: 1
ORTHOSTATIC HYPOTENSION: 1
COUGH: 1
SLEEP QUALITY: FAIR
DYSPNEA ON EXERTION: 1
ADDITIONAL INFORMATION: TENDERNESS
BOWEL INCONTINENCE: 1
DESCRIPTION OF MEMORY LOSS: SHORT TERM
BOWEL INCONTINENCE: 1
DESCRIPTION OF MEMORY LOSS: SHORT TERM
DIARRHEA: 1
DESCRIPTION OF MEMORY LOSS: IMMEDIATE
FATIGUE: 1
SLEEP QUALITY: ADEQUATE
AGITATION: 1
FATIGUES EASILY: 1
BOWEL PATTERN NORMAL: 1
SLEEP QUALITY: ADEQUATE
DESCRIPTION OF MEMORY LOSS: SHORT TERM
HYPOTENSION: 1
SLEEP QUALITY: FAIR
COUGH CHARACTERISTICS: DRY
STOOL FREQUENCY: LESS THAN DAILY
FATIGUES EASILY: 1
STOOL FREQUENCY: DAILY
FATIGUES EASILY: 1
DYSPNEA ON EXERTION: 1
LOWER EXTREMITY EDEMA: 1
DYSPNEA ON EXERTION: 1
DESCRIPTION OF MEMORY LOSS: SHORT TERM
DESCRIPTION OF MEMORY LOSS: SHORT TERM
BOWEL INCONTINENCE: 1
COUGH: 1
STOOL FREQUENCY: LESS THAN DAILY
DESCRIPTION OF MEMORY LOSS: IMMEDIATE
DESCRIPTION OF MEMORY LOSS: SHORT TERM
DRY SKIN: 1
AGITATION: 1
DESCRIPTION OF MEMORY LOSS: SHORT TERM
SLEEP QUALITY: FAIR
LAST BOWEL MOVEMENT: 64777
BOWEL INCONTINENCE: 1
ABDOMINAL PAIN: 1
DESCRIPTION OF MEMORY LOSS: SHORT TERM
FATIGUE: 1
STOOL FREQUENCY: LESS THAN DAILY
BOWEL PATTERN NORMAL: 1
DYSPNEA ON EXERTION: 1
LAST BOWEL MOVEMENT: 64691
DESCRIPTION OF MEMORY LOSS: SHORT TERM
BOWEL INCONTINENCE: 1
DESCRIPTION OF MEMORY LOSS: SHORT TERM
FORGETFULNESS: 1
DYSPNEA ON EXERTION: 1
LAST BOWEL MOVEMENT: 64662
HYPOTENSION: 1
LOWER EXTREMITY EDEMA: 1
LAST BOWEL MOVEMENT: 64736
DESCRIPTION OF MEMORY LOSS: SHORT TERM
BOWEL INCONTINENCE: 1
FATIGUES EASILY: 1
DESCRIPTION OF MEMORY LOSS: SHORT TERM
BOWEL PATTERN NORMAL: 1
TINGLING: 1
BOWEL PATTERN NORMAL: 1
FATIGUES EASILY: 1
DRY SKIN: 1
DRY SKIN: 1
DESCRIPTION OF MEMORY LOSS: SHORT TERM
DESCRIPTION OF MEMORY LOSS: SHORT TERM
FATIGUES EASILY: 1
FORGETFULNESS: 1
BOWEL PATTERN NORMAL: 1
STOOL FREQUENCY: LESS THAN DAILY
SLEEP QUALITY: FAIR
BOWEL PATTERN NORMAL: 1
FATIGUES EASILY: 1
DESCRIPTION OF MEMORY LOSS: IMMEDIATE
STOOL FREQUENCY: LESS THAN DAILY
COUGH: 1
SLEEP QUALITY: ADEQUATE
BOWEL PATTERN NORMAL: 1
SLEEP QUALITY: ADEQUATE
FATIGUES EASILY: 1
LAST BOWEL MOVEMENT: 64782
STOOL FREQUENCY: LESS THAN DAILY
DESCRIPTION OF MEMORY LOSS: SHORT TERM
BOWEL PATTERN NORMAL: 1
FATIGUES EASILY: 1
SLEEP QUALITY: ADEQUATE
COUGH: 1
DESCRIPTION OF MEMORY LOSS: SHORT TERM
FATIGUES EASILY: 1
DRY SKIN: 1
COUGH: 1
LAST BOWEL MOVEMENT: 64866
SLEEP QUALITY: ADEQUATE
DESCRIPTION OF MEMORY LOSS: IMMEDIATE
FATIGUE: 1
SLEEP QUALITY: ADEQUATE
DESCRIPTION OF MEMORY LOSS: SHORT TERM
COUGH: 1
SLEEP QUALITY: ADEQUATE
STOOL FREQUENCY: LESS THAN DAILY
STOOL FREQUENCY: LESS THAN DAILY
DESCRIPTION OF MEMORY LOSS: SHORT TERM
FORGETFULNESS: 1
BOWEL INCONTINENCE: 1
LAST BOWEL MOVEMENT: 64989
SLEEP QUALITY: ADEQUATE
FATIGUE: 1
LAST BOWEL MOVEMENT: 64883
STOOL FREQUENCY: LESS THAN DAILY
BOWEL INCONTINENCE: 1
FATIGUE: 1
STOOL FREQUENCY: DAILY
STOOL FREQUENCY: LESS THAN DAILY
WANDERING: 1
FATIGUES EASILY: 1
BOWEL INCONTINENCE: 1
LAST BOWEL MOVEMENT: 64687
SLEEP QUALITY: ADEQUATE
DESCRIPTION OF MEMORY LOSS: LONG TERM
LOWER EXTREMITY EDEMA: 1
SHORTNESS OF BREATH: 1
DYSPNEA ON EXERTION: 1
FATIGUES EASILY: 1
DESCRIPTION OF MEMORY LOSS: SHORT TERM
COUGH: 1
DESCRIPTION OF MEMORY LOSS: SHORT TERM
SLEEP QUALITY: ADEQUATE
FATIGUES EASILY: 1
STOOL FREQUENCY: LESS THAN DAILY
BOWEL PATTERN NORMAL: 1
FORGETFULNESS: 1
STOOL FREQUENCY: LESS THAN DAILY
DEPRESSED MOOD: 1
DRY SKIN: 1
BOWEL PATTERN NORMAL: 1
SLEEP QUALITY: ADEQUATE
LAST BOWEL MOVEMENT: 64767
FATIGUES EASILY: 1
DESCRIPTION OF MEMORY LOSS: IMMEDIATE
STOOL FREQUENCY: LESS THAN DAILY
DESCRIPTION OF MEMORY LOSS: IMMEDIATE
BOWEL PATTERN NORMAL: 1
DIZZINESS: 1
STOOL FREQUENCY: DAILY
COUGH: 1
FATIGUES EASILY: 1
DYSPNEA ON EXERTION: 1
BOWEL INCONTINENCE: 1
DESCRIPTION OF MEMORY LOSS: SHORT TERM
DESCRIPTION OF MEMORY LOSS: IMMEDIATE
LAST BOWEL MOVEMENT: 64655
DESCRIPTION OF MEMORY LOSS: SHORT TERM
ABDOMINAL PAIN: 1
DESCRIPTION OF MEMORY LOSS: IMMEDIATE
COUGH: 1
FATIGUE: 1
LAST BOWEL MOVEMENT: 64667
HYPOTENSION: 1
FATIGUES EASILY: 1
FATIGUES EASILY: 1
SLEEP QUALITY: ADEQUATE
BOWEL INCONTINENCE: 1
DESCRIPTION OF MEMORY LOSS: IMMEDIATE
SLEEP QUALITY: FAIR
STOOL FREQUENCY: LESS THAN DAILY
SOMNOLENCE: 1
STOOL FREQUENCY: LESS THAN DAILY
DESCRIPTION OF MEMORY LOSS: IMMEDIATE
LAST BOWEL MOVEMENT: 64923
CONSTIPATION: 1
SLEEP QUALITY: ADEQUATE
SLEEP QUALITY: ADEQUATE
FATIGUE: 1
SLEEP QUALITY: ADEQUATE
LOWER EXTREMITY EDEMA: 1
DESCRIPTION OF MEMORY LOSS: IMMEDIATE
LAST BOWEL MOVEMENT: 64677
SLEEP QUALITY: ADEQUATE
LOWER EXTREMITY EDEMA: 1
STOOL FREQUENCY: LESS THAN DAILY
DEPRESSED MOOD: 1
SLEEP QUALITY: FAIR
SLEEP QUALITY: ADEQUATE
LAST BOWEL MOVEMENT: 64719
FATIGUES EASILY: 1
ADDITIONAL INFORMATION: DENIES PAIN
BOWEL INCONTINENCE: 1
STOOL FREQUENCY: LESS THAN DAILY
LOWER EXTREMITY EDEMA: 1
DEPRESSED MOOD: 1
COUGH CHARACTERISTICS: NON-PRODUCTIVE
LAST BOWEL MOVEMENT: 64889
STOOL FREQUENCY: LESS THAN DAILY
FATIGUES EASILY: 1
COUGH CHARACTERISTICS: NON-PRODUCTIVE
SLEEP QUALITY: ADEQUATE
SLEEP QUALITY: ADEQUATE
DYSPNEA ON EXERTION: 1
DESCRIPTION OF MEMORY LOSS: SHORT TERM
BOWEL INCONTINENCE: 1
FATIGUES EASILY: 1
CLAUDICATION: 1
DESCRIPTION OF MEMORY LOSS: SHORT TERM
LOSS OF SENSATION: 1
ADDITIONAL INFORMATION: GROIN
COUGH CHARACTERISTICS: NON-PRODUCTIVE
LAST BOWEL MOVEMENT: 64826
LAST BOWEL MOVEMENT: 64715
CONTUSION: 1
DESCRIPTION OF MEMORY LOSS: IMMEDIATE
LAST BOWEL MOVEMENT: 64802
DESCRIPTION OF MEMORY LOSS: IMMEDIATE
FATIGUES EASILY: 1
STOOL FREQUENCY: LESS THAN DAILY
BOWEL PATTERN NORMAL: 1
FATIGUES EASILY: 1
FATIGUE: 1
FORGETFULNESS: 1
FATIGUES EASILY: 1
STOOL FREQUENCY: DAILY
STOOL FREQUENCY: DAILY
BOWEL INCONTINENCE: 1
ABDOMINAL PAIN: 1
FORGETFULNESS: 1
LAST BOWEL MOVEMENT: 64932
STOOL FREQUENCY: DAILY
FORGETFULNESS: 1
SOMNOLENCE: 1
LOWER EXTREMITY EDEMA: 1
FORGETFULNESS: 1
LAST BOWEL MOVEMENT: 64770
HYPOTENSION: 1
COUGH CHARACTERISTICS: NON-PRODUCTIVE
DESCRIPTION OF MEMORY LOSS: SHORT TERM
DESCRIPTION OF MEMORY LOSS: IMMEDIATE
ABDOMINAL PAIN: 1
DESCRIPTION OF MEMORY LOSS: SHORT TERM
HYPOTENSION: 1
COUGH CHARACTERISTICS: NON-PRODUCTIVE
LAST BOWEL MOVEMENT: 64705
ABDOMINAL PAIN: 1
LAST BOWEL MOVEMENT: 64945
FATIGUE: 1
LAST BOWEL MOVEMENT: 64847
DESCRIPTION OF MEMORY LOSS: SHORT TERM
FATIGUES EASILY: 1
HYPOTENSION: 1
FATIGUES EASILY: 1
STOOL FREQUENCY: LESS THAN DAILY
FATIGUE: 1
BOWEL PATTERN NORMAL: 1
SLEEP QUALITY: ADEQUATE
BOWEL INCONTINENCE: 1
DESCRIPTION OF MEMORY LOSS: SHORT TERM
BOWEL PATTERN NORMAL: 1
BOWEL INCONTINENCE: 1
FATIGUE: 1
DRY SKIN: 1
SLEEP QUALITY: ADEQUATE
STOOL FREQUENCY: DAILY
SHORTNESS OF BREATH: 1
LAST BOWEL MOVEMENT: 64721
DESCRIPTION OF MEMORY LOSS: IMMEDIATE
FATIGUES EASILY: 1
SLEEP QUALITY: ADEQUATE
DYSPNEA ON EXERTION: 1
FATIGUES EASILY: 1
FATIGUES EASILY: 1
BOWEL PATTERN NORMAL: 1
FATIGUES EASILY: 1
SLEEP QUALITY: FAIR
ABDOMINAL PAIN: 1
FATIGUES EASILY: 1
DESCRIPTION OF MEMORY LOSS: LONG TERM
DRY SKIN: 1
HYPOTENSION: 1
DESCRIPTION OF MEMORY LOSS: SHORT TERM
COUGH: 1
FATIGUES EASILY: 1
CONSTIPATION: 1
HYPOTENSION: 1
FATIGUE: 1
FATIGUE: 1
BOWEL PATTERN NORMAL: 1
COUGH CHARACTERISTICS: NON-PRODUCTIVE
CONSTIPATION: 1
DYSPNEA ACTIVITY LEVEL: WHILE SPEAKING
SLEEP QUALITY: ADEQUATE
FLATUS: 1
DYSPNEA ON EXERTION: 1
FATIGUES EASILY: 1
SLEEP QUALITY: ADEQUATE
LAST BOWEL MOVEMENT: 64838
DESCRIPTION OF MEMORY LOSS: IMMEDIATE
BOWEL PATTERN NORMAL: 1
SLEEP QUALITY: ADEQUATE
LOSS OF SENSATION: 1
LOWER EXTREMITY EDEMA: 1
SLEEP QUALITY: FAIR
BOWEL INCONTINENCE: 1
FATIGUES EASILY: 1
STOOL FREQUENCY: LESS THAN DAILY
SLEEP QUALITY: ADEQUATE
SLEEP QUALITY: ADEQUATE
DESCRIPTION OF MEMORY LOSS: IMMEDIATE
DESCRIPTION OF MEMORY LOSS: SHORT TERM

## 2018-01-01 ASSESSMENT — ACTIVITIES OF DAILY LIVING (ADL)
MONEY MANAGEMENT (EXPENSES/BILLS): TOTALLY DEPENDENT
SHOPPING_ASSISTANCE: 6
MONEY MANAGEMENT (EXPENSES/BILLS): TOTALLY DEPENDENT
BATHING_ASSISTANCE: 6
ORAL_CARE_ASSISTIVE_EQUIPMENT_NEEDED: ASSIST
MONEY MANAGEMENT (EXPENSES/BILLS): TOTALLY DEPENDENT
DRESSING_UB_ASSISTANCE: 6
MONEY MANAGEMENT (EXPENSES/BILLS): TOTALLY DEPENDENT
EATING_ASSISTANCE: 0
MONEY MANAGEMENT (EXPENSES/BILLS): TOTALLY DEPENDENT
HOUSEKEEPING_ASSISTANCE: 6
MONEY MANAGEMENT (EXPENSES/BILLS): TOTALLY DEPENDENT
MONEY MANAGEMENT (EXPENSES/BILLS): NEEDS ASSISTANCE
MONEY MANAGEMENT (EXPENSES/BILLS): TOTALLY DEPENDENT
GROOMING_COMMENTS: ASSIST
MONEY MANAGEMENT (EXPENSES/BILLS): NEEDS ASSISTANCE
DRESSING_UB_COMMENTS: DEPENDENT
MEAL_PREP_ASSISTANCE: 6
BATHING_ASSISTIVE_EQUIPMENT_NEEDED: SHOWER CHAIR
MONEY MANAGEMENT (EXPENSES/BILLS): TOTALLY DEPENDENT
MONEY MANAGEMENT (EXPENSES/BILLS): TOTALLY DEPENDENT
BATHING_ASSISTIVE_EQUIPMENT_USED: SHOWER CHAIR
TRANSPORTATION_ASSISTANCE: 6
TOILETING_COMMENTS: INCONTINENT
DRESSING_LB_ASSISTANCE: 6
TELEPHONE_ASSISTANCE: 1
MONEY MANAGEMENT (EXPENSES/BILLS): TOTALLY DEPENDENT
TOILETING_ASSISTANCE: 6
MONEY MANAGEMENT (EXPENSES/BILLS): NEEDS ASSISTANCE
MONEY MANAGEMENT (EXPENSES/BILLS): TOTALLY DEPENDENT
MONEY MANAGEMENT (EXPENSES/BILLS): TOTALLY DEPENDENT
GROOMING_ASSISTANCE: 6
LAUNDRY_ASSISTANCE: 6
ORAL_CARE_ASSISTANCE: 6
DRESSING_LB_COMMENTS: DEPENDENT

## 2018-01-01 ASSESSMENT — SOCIAL DETERMINANTS OF HEALTH (SDOH)
ACTIVE STRESSOR - HEALTH CHANGES: 1
ACTIVE STRESSOR - NO STRESS FACTORS: 1
ACTIVE STRESSOR - NO STRESS FACTORS: 1
ACTIVE STRESSOR - LOSS OF CONTROL: 1
ACTIVE STRESSOR - NO STRESS FACTORS: 1
ACTIVE STRESSOR - HEALTH CHANGES: 1
ACTIVE STRESSOR - NO STRESS FACTORS: 1
ACTIVE STRESSOR - NO STRESS FACTORS: 1
ACTIVE STRESSOR - LACK OF CAREGIVERS: 1
ACTIVE STRESSOR - NO STRESS FACTORS: 1
ACTIVE STRESSOR - NO STRESS FACTORS: 1
ACTIVE STRESSOR - HEALTH CHANGES: 1
ACTIVE STRESSOR - NO STRESS FACTORS: 1
ACTIVE STRESSOR - HEALTH CHANGES: 1
ACTIVE STRESSOR - NO STRESS FACTORS: 1
ACTIVE STRESSOR - HEALTH CHANGES: 1
ACTIVE STRESSOR - LOSS OF CONTROL: 1
ACTIVE STRESSOR - NO STRESS FACTORS: 1
ACTIVE STRESSOR - HEALTH CHANGES: 1
ACTIVE STRESSOR - NO STRESS FACTORS: 1
ACTIVE STRESSOR - NO STRESS FACTORS: 1
ACTIVE STRESSOR - LOSS OF CONTROL: 1
ACTIVE STRESSOR - NO STRESS FACTORS: 1
ACTIVE STRESSOR - NO STRESS FACTORS: 1
ACTIVE STRESSOR - EXPRESSED EMOTIONAL NEED: 1
ACTIVE STRESSOR - NO STRESS FACTORS: 1
ACTIVE STRESSOR - HEALTH CHANGES: 1
ACTIVE STRESSOR - NO STRESS FACTORS: 1
ACTIVE STRESSOR - EXPRESSED EMOTIONAL NEED: 1
ACTIVE STRESSOR - NO STRESS FACTORS: 1
ACTIVE STRESSOR - LOSS OF CONTROL: 1
ACTIVE STRESSOR - NO STRESS FACTORS: 1
ACTIVE STRESSOR - NO STRESS FACTORS: 1
ACTIVE STRESSOR - LOSS OF CONTROL: 1
ACTIVE STRESSOR - HEALTH CHANGES: 1
ACTIVE STRESSOR - NO STRESS FACTORS: 1
ACTIVE STRESSOR - HEALTH CHANGES: 1
ACTIVE STRESSOR - HEALTH CHANGES: 1
ACTIVE STRESSOR - EXPRESSED EMOTIONAL NEED: 1
ACTIVE STRESSOR - NO STRESS FACTORS: 1
ACTIVE STRESSOR - NO STRESS FACTORS: 1
ACTIVE STRESSOR - EXPRESSED EMOTIONAL NEED: 1
ACTIVE STRESSOR - NO STRESS FACTORS: 1
ACTIVE STRESSOR - HEALTH CHANGES: 1
ACTIVE STRESSOR - HEALTH CHANGES: 1
ACTIVE STRESSOR - NO STRESS FACTORS: 1
ACTIVE STRESSOR - NO STRESS FACTORS: 1
ACTIVE STRESSOR - HEALTH CHANGES: 1
ACTIVE STRESSOR - NO STRESS FACTORS: 1

## 2019-01-01 ENCOUNTER — HOME CARE VISIT (OUTPATIENT)
Dept: HOSPICE | Facility: HOSPICE | Age: 84
End: 2019-01-01
Payer: MEDICARE

## 2019-01-01 PROCEDURE — G0299 HHS/HOSPICE OF RN EA 15 MIN: HCPCS

## 2019-01-01 PROCEDURE — 665035 HSPC ROOM AND BOARD PER DIEM

## 2019-01-01 PROCEDURE — S9126 HOSPICE CARE, IN THE HOME, P: HCPCS

## 2019-01-10 ENCOUNTER — APPOINTMENT (OUTPATIENT)
Dept: HOSPICE | Facility: HOSPICE | Age: 84
End: 2019-01-10
Payer: MEDICARE

## 2019-01-17 ENCOUNTER — APPOINTMENT (OUTPATIENT)
Dept: HOSPICE | Facility: HOSPICE | Age: 84
End: 2019-01-17
Payer: MEDICARE

## 2019-01-24 ENCOUNTER — APPOINTMENT (OUTPATIENT)
Dept: HOSPICE | Facility: HOSPICE | Age: 84
End: 2019-01-24
Payer: MEDICARE

## 2019-01-31 ENCOUNTER — APPOINTMENT (OUTPATIENT)
Dept: HOSPICE | Facility: HOSPICE | Age: 84
End: 2019-01-31
Payer: MEDICARE

## 2019-02-07 ENCOUNTER — APPOINTMENT (OUTPATIENT)
Dept: HOSPICE | Facility: HOSPICE | Age: 84
End: 2019-02-07
Payer: MEDICARE

## 2019-02-14 ENCOUNTER — APPOINTMENT (OUTPATIENT)
Dept: HOSPICE | Facility: HOSPICE | Age: 84
End: 2019-02-14
Payer: MEDICARE